# Patient Record
Sex: FEMALE | Race: WHITE | NOT HISPANIC OR LATINO | Employment: FULL TIME | URBAN - METROPOLITAN AREA
[De-identification: names, ages, dates, MRNs, and addresses within clinical notes are randomized per-mention and may not be internally consistent; named-entity substitution may affect disease eponyms.]

---

## 2017-03-08 DIAGNOSIS — J30.1 ALLERGIC RHINITIS DUE TO POLLEN: ICD-10-CM

## 2017-03-08 RX ORDER — FLUTICASONE PROPIONATE 50 MCG
1-2 SPRAY, SUSPENSION (ML) NASAL DAILY
Qty: 48 G | Refills: 1 | Status: SHIPPED | OUTPATIENT
Start: 2017-03-08 | End: 2017-11-13

## 2017-03-08 NOTE — TELEPHONE ENCOUNTER
Flonase      Last Written Prescription Date: 4/4/16  Last Fill Quantity: 48gm,  # refills: 2   Last Office Visit with G, [facility] or [facility] prescribing provider: 10/13/16      Dinorah Yin CPhT  [facility]    On behalf of [facility]

## 2017-03-08 NOTE — TELEPHONE ENCOUNTER
Prescription approved per [facility] Refill Protocol.    Negrita Lindquist, PharmD  [facility]  [phone]

## 2017-10-20 ENCOUNTER — ALLIED HEALTH/NURSE VISIT (OUTPATIENT)
Dept: FAMILY MEDICINE | Facility: CLINIC | Age: 42
End: 2017-10-20
Payer: COMMERCIAL

## 2017-10-20 DIAGNOSIS — Z23 NEED FOR PROPHYLACTIC VACCINATION AND INOCULATION AGAINST INFLUENZA: Primary | ICD-10-CM

## 2017-10-20 PROCEDURE — 90686 IIV4 VACC NO PRSV 0.5 ML IM: CPT

## 2017-10-20 PROCEDURE — 90471 IMMUNIZATION ADMIN: CPT

## 2017-10-20 PROCEDURE — 99207 ZZC NO CHARGE NURSE ONLY: CPT

## 2017-10-20 NOTE — MR AVS SNAPSHOT
"              After Visit Summary   10/20/2017    Hannah Carrillo    MRN: [id number]           Patient Information     Date Of Birth          1975        Visit Information        Provider Department      10/20/2017 1:00 PM [facility] [place]        Today's Diagnoses     Need for prophylactic vaccination and inoculation against influenza    -  1       Follow-ups after your visit        Who to contact     Normal or non-critical lab and imaging results will be communicated to you by MyChart, letter or phone within 4 business days after the clinic has received the results. If you do not hear from us within 7 days, please contact the clinic through MyChart or phone. If you have a critical or abnormal lab result, we will notify you by phone as soon as possible.  Submit refill requests through [employer] or call your pharmacy and they will forward the refill request to us. Please allow 3 business days for your refill to be completed.          If you need to speak with a  for additional information , please call: [phone]             Additional Information About Your Visit        [employer] Information     [employer] lets you send messages to your doctor, view your test results, renew your prescriptions, schedule appointments and more. To sign up, go to [email or web address] . Click on \"Log in\" on the left side of the screen, which will take you to the Welcome page. Then click on \"Sign up Now\" on the right side of the page.     You will be asked to enter the access code listed below, as well as some personal information. Please follow the directions to create your username and password.     Your access code is: [email or web address]  Expires: 2018  1:21 PM     Your access code will  in 90 days. If you need help or a new code, please call your [facility] or [phone].        Care EveryWhere ID     This is your Care EveryWhere ID. This could be used by other organizations to " access your [place] medical records  [id number]         Blood Pressure from Last 3 Encounters:   10/13/16 129/81   07/13/16 120/82   06/11/16 141/81    Weight from Last 3 Encounters:   10/13/16 171 lb 14.4 oz (78 kg)   07/13/16 177 lb 4.8 oz (80.4 kg)   05/17/16 176 lb 14.4 oz (80.2 kg)              We Performed the Following     FLU VAC, SPLIT VIRUS IM > 3 YO (QUADRIVALENT) [29379]     Vaccine Administration, Initial [08449]        Primary Care Provider Office Phone # Fax #    Julio Mcdaniel [phone]       [facility] [address] [facility] [address]        Equal Access to Services     ESCOBAR NERI : Hadii bora salomono Sochristopher, waaxda luqadaha, qaybta kaalmada adeegyada, derrick titus . So [facility] [phone].    ATENCIÓN: Si habla español, tiene a gaitan disposición servicios gratuitos de asistencia lingüística. Llame al [phone].    We comply with applicable federal civil rights laws and [place] laws. We do not discriminate on the basis of race, color, national origin, age, disability, sex, sexual orientation, or gender identity.            Thank you!     Thank you for choosing [facility]  for your care. Our goal is always to provide you with excellent care. Hearing back from our patients is one way we can continue to improve our services. Please take a few minutes to complete the written survey that you may receive in the mail after your visit with us. Thank you!             Your Updated Medication List - Protect others around you: Learn how to safely use, store and throw away your medicines at www.disposemymeds.org.          This list is accurate as of: 10/20/17  1:21 PM.  Always use your most recent med list.                   Brand Name Dispense Instructions for use Diagnosis    albuterol 108 (90 BASE) MCG/ACT Inhaler    PROAIR HFA/PROVENTIL HFA/VENTOLIN HFA    1 Inhaler    Inhale 2 puffs into the lungs every 6 hours as needed for  shortness of breath / dyspnea or wheezing    Cough       etonogestrel-ethinyl estradiol 0.12-0.015 MG/24HR vaginal ring    NUVARING     Place 1 each vaginally every 28 days    Epigastric pain       fluticasone 50 MCG/ACT spray    FLONASE    48 g    Spray 1-2 sprays into both nostrils daily    Allergic rhinitis due to pollen       HYDROcodone-acetaminophen 5-325 MG per tablet    NORCO    20 tablet    Take 1-2 tablets by mouth every 4 hours as needed for moderate to severe pain        Multi-vitamin Tabs tablet   Generic drug:  multivitamin, therapeutic with minerals      Take 1 tablet by mouth daily.        naproxen 500 MG tablet    NAPROSYN    30 tablet    Take 1 tablet (500 mg) by mouth 2 times daily as needed for moderate pain    Rib pain       OMEGA-3 FISH OIL PO           ZYRTEC 10 MG tablet   Generic drug:  cetirizine      1 TABLET DAILY

## 2017-11-13 DIAGNOSIS — J30.1 ALLERGIC RHINITIS DUE TO POLLEN: Primary | ICD-10-CM

## 2017-11-13 NOTE — TELEPHONE ENCOUNTER
Flonase      Last Written Prescription Date: 03/08/2017  Last Fill Quantity: 48,  # refills: 1   Last Office Visit with [facility], [facility] or [facility] prescribing provider: 10/13/2016    See Osman  Pharmacy Technician, Ty  [facility]  Phone: [phone]  Fax: [phone]

## 2017-11-14 RX ORDER — FLUTICASONE PROPIONATE 50 MCG
1-2 SPRAY, SUSPENSION (ML) NASAL DAILY
Qty: 16 G | Refills: 0 | Status: SHIPPED | OUTPATIENT
Start: 2017-11-14 | End: 2017-12-28

## 2017-11-14 NOTE — TELEPHONE ENCOUNTER
Medication is being filled for 1 time refill only due to:  Patient needs to be seen because it has been more than one year since last visit.   Wilmer Aguilar RN

## 2017-12-28 DIAGNOSIS — J30.1 ALLERGIC RHINITIS DUE TO POLLEN: ICD-10-CM

## 2018-01-03 RX ORDER — FLUTICASONE PROPIONATE 50 MCG
1 SPRAY, SUSPENSION (ML) NASAL DAILY
Qty: 16 G | Refills: 0 | Status: SHIPPED | OUTPATIENT
Start: 2018-01-03

## 2018-01-03 NOTE — TELEPHONE ENCOUNTER
Prescription approved per [facility] Refill Protocol. Patient has appointment on 1/15/18.  Wilmer Aguilar RN

## 2018-03-12 ENCOUNTER — OFFICE VISIT (OUTPATIENT)
Dept: OTOLARYNGOLOGY | Facility: CLINIC | Age: 43
End: 2018-03-12
Payer: COMMERCIAL

## 2018-03-12 VITALS
SYSTOLIC BLOOD PRESSURE: 126 MMHG | WEIGHT: 175 LBS | BODY MASS INDEX: 28.03 KG/M2 | DIASTOLIC BLOOD PRESSURE: 80 MMHG | TEMPERATURE: 97.7 F

## 2018-03-12 DIAGNOSIS — J32.9 CHRONIC SINUSITIS, UNSPECIFIED LOCATION: Primary | ICD-10-CM

## 2018-03-12 PROCEDURE — 99203 OFFICE O/P NEW LOW 30 MIN: CPT | Performed by: OTOLARYNGOLOGY

## 2018-03-12 RX ORDER — FLUTICASONE PROPIONATE 50 MCG
1-2 SPRAY, SUSPENSION (ML) NASAL DAILY
Qty: 16 G | Refills: 1 | Status: SHIPPED | OUTPATIENT
Start: 2018-03-12 | End: 2018-05-29

## 2018-03-12 ASSESSMENT — PAIN SCALES - GENERAL: PAINLEVEL: NO PAIN (0)

## 2018-03-12 NOTE — LETTER
3/12/2018         RE: Hannah Carrillo  [id number] [facility] [id number]        Dear Colleague,    Thank you for referring your patient, Hannah Carrillo, to the [facility]. Please see a copy of my visit note below.        History of Present Illness - Hannah Carrillo is a 43 year old female who presents with concerns about chronic sinusitis.  She describes bilateral frontal and maxillary sinus pressure and discomfort.  She has a history of a nasal surgery about 20 years ago performed by a plastic surgeon.  She denies any specific difficulty breathing through her nose but doesfeel congested.  She denies any thick rhinorrhea.  She has tried nasal steroids  and antihistamines in the past year without benefit.    Past Medical History -   Patient Active Problem List   Diagnosis     CARDIOVASCULAR SCREENING; LDL GOAL LESS THAN 160     Health Care Home       Current Medications -   Current Outpatient Prescriptions:      fluticasone (FLONASE) 50 MCG/ACT spray, Spray 1-2 sprays into both nostrils daily, Disp: 16 g, Rfl: 1     fluticasone (FLONASE) 50 MCG/ACT spray, Spray 1 spray into both nostrils daily, Disp: 16 g, Rfl: 0     Omega-3 Fatty Acids (OMEGA-3 FISH OIL PO), , Disp: , Rfl:      etonogestrel-ethinyl estradiol (NUVARING) 0.12-0.015 MG/24HR vaginal ring, Place 1 each vaginally every 28 days, Disp: , Rfl:      Multiple Vitamin (MULTI-VITAMIN) per tablet, Take 1 tablet by mouth daily., Disp: , Rfl:      ZYRTEC 10 MG OR TABS, 1 TABLET DAILY, Disp: , Rfl:     Allergies -   Allergies   Allergen Reactions     Penicillins      When was a baby     Sulfa Drugs      rash       Social History -   Social History     Social History     Marital status:      Spouse name: Darren Lujan     Number of children: 1     Years of education: 16     Occupational History      [employer]     Social History Main Topics     Smoking status: Never Smoker     Smokeless tobacco: Never Used      Alcohol use 0.0 oz/week     0 Standard drinks or equivalent per week      Comment: 3 drinks per week     Drug use: No     Sexual activity: Yes     Partners: Male     Birth control/ protection: Inserts, Inserts/Ring     Other Topics Concern     Parent/Sibling W/ Cabg, Mi Or Angioplasty Before 65f 55m? No     Social History Narrative       Family History -   Family History   Problem Relation Age of Onset     DIABETES Father      CEREBROVASCULAR DISEASE Father      Parkinsonism Mother        Review of Systems - As per HPI and PMHx, otherwise 7 system review of the head and neck negative. 10+ system review negative.    Physical Exam  /80 (BP Location: Right arm, Patient Position: Chair, Cuff Size: Adult Regular)  Temp 97.7  F (36.5  C) (Oral)  Wt 79.4 kg (175 lb)  BMI 28.03 kg/m2  General - The patient is well nourished and well developed, and appears to have good nutritional status.  Alert and oriented to person and place, answers questions and cooperates with examination appropriately.   Head and Face - Normocephalic and atraumatic, with no gross asymmetry noted of the contour of the facial features.  The facial nerve is intact, with strong symmetric movements.  Voice and Breathing - The patient was breathing comfortably without the use of accessory muscles. There was no wheezing, stridor, or stertor.  The patients voice was clear and strong, and had appropriate pitch and quality.  Ears - Bilateral pinna and EACs with normal appearing overlying skin. Tympanic membrane intact with good mobility on pneumatic otoscopy bilaterally. Bony landmarks of the ossicular chain are normal. The tympanic membranes are normal in appearance. No retraction, perforation, or masses.  No fluid or purulence was seen in the external canal or the middle ear.   Eyes - Extraocular movements intact.  Sclera were not icteric or injected, conjunctiva were pink and moist.  Mouth - Examination of the oral cavity showed pink, healthy  oral mucosa. No lesions or ulcerations noted.  The tongue was mobile and midline, and the dentition were in good condition.    Throat - The walls of the oropharynx were smooth, pink, moist, symmetric, and had no lesions or ulcerations.  The tonsillar pillars and soft palate were symmetric.  The uvula was midline on elevation.  Neck - Normal midline excursion of the laryngotracheal complex during swallowing.  Full range of motion on passive movement.  Palpation of the occipital, submental, submandibular, internal jugular chain, and supraclavicular nodes did not demonstrate any abnormal lymph nodes or masses.  The carotid pulse was palpable bilaterally.  Palpation of the thyroid was soft and smooth, with no nodules or goiter appreciated.  The trachea was mobile and midline.  Nose - External contour is symmetric, no gross deflection or scars.  Nasal mucosa is pink and moist with no abnormal mucus.  The septum was midline and non-obstructive, turbinates of normal size and position.  No polyps, masses, or purulence noted on examination.          Assessment - Hannah Carrillo is a 43 year old female with possible chronic sinusitis.  She is already tried Flonase without resolution.  I have requested a CT scan of her sinuses to evaluate for possible chronic sinus disease.  I will ask her to return should there be any positive findings on the CT scan.  Otherwise, I advised her to consider evaluation for potential headache disorders or other causes of facial pain.    Dr. Rosa M Silva MD  Otolaryngology  [facility]        Again, thank you for allowing me to participate in the care of your patient.        Sincerely,        Rosa M Silva MD

## 2018-03-12 NOTE — MR AVS SNAPSHOT
After Visit Summary   3/12/2018    Hannah Carrillo    MRN: [id number]           Patient Information     Date Of Birth          1975        Visit Information        Provider Department      3/12/2018 1:30 PM Rosa M Silva MD [facility]        Today's Diagnoses     Chronic sinusitis, unspecified location    -  1      Care Instructions    Per physician's instructions            Follow-ups after your visit        Your next 10 appointments already scheduled     Mar 24, 2018 10:00 AM CDT   CT MAXILLOFACIAL W/O CONTRAST with WYCT1   [facility] ([facility])    [address] [address]   [phone]           Please bring any scans or X-rays taken at other hospitals, if similar tests were done. Also bring a list of your medicines, including vitamins, minerals and over-the-counter drugs. It is safest to leave personal items at home.  Be sure to tell your doctor:   If you have any allergies.   If there s any chance you are pregnant.   If you are breastfeeding.  You do not need to do anything special to prepare for this exam.  Please wear loose clothing, such as a sweat suit or jogging clothes. Avoid snaps, zippers and other metal. We may ask you to undress and put on a hospital gown.              Future tests that were ordered for you today     Open Future Orders        Priority Expected Expires Ordered    CT Maxillofacial w/o Contrast Routine  3/12/2019 3/12/2018            Who to contact     If you have questions or need follow up information about today's clinic visit or your schedule please contact [facility] directly at [phone].  Normal or non-critical lab and imaging results will be communicated to you by MyChart, letter or phone within 4 business days after the clinic has received the results. If you do not hear from us within 7 days, please contact the clinic through MyChart or phone. If you have a critical or  "abnormal lab result, we will notify you by phone as soon as possible.  Submit refill requests through [employer] or call your pharmacy and they will forward the refill request to us. Please allow 3 business days for your refill to be completed.          Additional Information About Your Visit        [employer] Information     [employer] lets you send messages to your doctor, view your test results, renew your prescriptions, schedule appointments and more. To sign up, go to [email or web address] . Click on \"Log in\" on the left side of the screen, which will take you to the Welcome page. Then click on \"Sign up Now\" on the right side of the page.     You will be asked to enter the access code listed below, as well as some personal information. Please follow the directions to create your username and password.     Your access code is: [email or web address]  Expires: 6/10/2018  7:08 PM     Your access code will  in 90 days. If you need help or a new code, please call your [place] clinic or [phone].        Care EveryWhere ID     This is your Care EveryWhere ID. This could be used by other organizations to access your [place] medical records  [id number]        Your Vitals Were     Temperature BMI (Body Mass Index)                97.7  F (36.5  C) (Oral) 28.03 kg/m2           Blood Pressure from Last 3 Encounters:   18 126/80   10/13/16 129/81   16 120/82    Weight from Last 3 Encounters:   18 79.4 kg (175 lb)   10/13/16 78 kg (171 lb 14.4 oz)   16 80.4 kg (177 lb 4.8 oz)                 Today's Medication Changes          These changes are accurate as of 3/12/18  7:08 PM.  If you have any questions, ask your nurse or doctor.               These medicines have changed or have updated prescriptions.        Dose/Directions    * fluticasone 50 MCG/ACT spray   Commonly known as:  FLONASE   This may have changed:  Another medication with the same name was added. Make sure you understand how and when to " take each.   Used for:  Allergic rhinitis due to pollen   Changed by:  Rosa M Silva MD        Dose:  1 spray   Spray 1 spray into both nostrils daily   Quantity:  16 g   Refills:  0       * fluticasone 50 MCG/ACT spray   Commonly known as:  FLONASE   This may have changed:  You were already taking a medication with the same name, and this prescription was added. Make sure you understand how and when to take each.   Used for:  Chronic sinusitis, unspecified location   Changed by:  Rosa M Silva MD        Dose:  1-2 spray   Spray 1-2 sprays into both nostrils daily   Quantity:  16 g   Refills:  1       * Notice:  This list has 2 medication(s) that are the same as other medications prescribed for you. Read the directions carefully, and ask your doctor or other care provider to review them with you.         Where to get your medicines      These medications were sent to [facility] [address] [facility]  [address] [facility] [address]     Phone:  [phone]     fluticasone 50 MCG/ACT spray                Primary Care Provider Office Phone # Fax #    Julio Mcdaniel [phone]       [facility] [address] [facility] [address]        Equal Access to Services     ESCOBAR NERI AH: Hadii bora pickard hadasho Soomaali, waaxda luqadaha, qaybta kaalmada adeegyada, derrick berman. So [facility] [phone].    ATENCIÓN: Si habla español, tiene a gaitan disposición servicios gratuitos de asistencia lingüística. Llame al [phone].    We comply with applicable federal civil rights laws and [place] laws. We do not discriminate on the basis of race, color, national origin, age, disability, sex, sexual orientation, or gender identity.            Thank you!     Thank you for choosing [facility]  for your care. Our goal is always to provide you with excellent care. Hearing back from our patients is one way we can continue to improve  our services. Please take a few minutes to complete the written survey that you may receive in the mail after your visit with us. Thank you!             Your Updated Medication List - Protect others around you: Learn how to safely use, store and throw away your medicines at www.disposemymeds.org.          This list is accurate as of 3/12/18  7:08 PM.  Always use your most recent med list.                   Brand Name Dispense Instructions for use Diagnosis    etonogestrel-ethinyl estradiol 0.12-0.015 MG/24HR vaginal ring    NUVARING     Place 1 each vaginally every 28 days    Epigastric pain       * fluticasone 50 MCG/ACT spray    FLONASE    16 g    Spray 1 spray into both nostrils daily    Allergic rhinitis due to pollen       * fluticasone 50 MCG/ACT spray    FLONASE    16 g    Spray 1-2 sprays into both nostrils daily    Chronic sinusitis, unspecified location       Multi-vitamin Tabs tablet   Generic drug:  multivitamin, therapeutic with minerals      Take 1 tablet by mouth daily.        OMEGA-3 FISH OIL PO           ZYRTEC 10 MG tablet   Generic drug:  cetirizine      1 TABLET DAILY        * Notice:  This list has 2 medication(s) that are the same as other medications prescribed for you. Read the directions carefully, and ask your doctor or other care provider to review them with you.

## 2018-03-12 NOTE — PROGRESS NOTES
History of Present Illness - Hannah Carrillo is a 43 year old female who presents with concerns about chronic sinusitis.  She describes bilateral frontal and maxillary sinus pressure and discomfort.  She has a history of a nasal surgery about 20 years ago performed by a plastic surgeon.  She denies any specific difficulty breathing through her nose but doesfeel congested.  She denies any thick rhinorrhea.  She has tried nasal steroids  and antihistamines in the past year without benefit.    Past Medical History -   Patient Active Problem List   Diagnosis     CARDIOVASCULAR SCREENING; LDL GOAL LESS THAN 160     Health Care Home       Current Medications -   Current Outpatient Prescriptions:      fluticasone (FLONASE) 50 MCG/ACT spray, Spray 1-2 sprays into both nostrils daily, Disp: 16 g, Rfl: 1     fluticasone (FLONASE) 50 MCG/ACT spray, Spray 1 spray into both nostrils daily, Disp: 16 g, Rfl: 0     Omega-3 Fatty Acids (OMEGA-3 FISH OIL PO), , Disp: , Rfl:      etonogestrel-ethinyl estradiol (NUVARING) 0.12-0.015 MG/24HR vaginal ring, Place 1 each vaginally every 28 days, Disp: , Rfl:      Multiple Vitamin (MULTI-VITAMIN) per tablet, Take 1 tablet by mouth daily., Disp: , Rfl:      ZYRTEC 10 MG OR TABS, 1 TABLET DAILY, Disp: , Rfl:     Allergies -   Allergies   Allergen Reactions     Penicillins      When was a baby     Sulfa Drugs      rash       Social History -   Social History     Social History     Marital status:      Spouse name: Darren Lujan     Number of children: 1     Years of education: 16     Occupational History      [employer]     Social History Main Topics     Smoking status: Never Smoker     Smokeless tobacco: Never Used     Alcohol use 0.0 oz/week     0 Standard drinks or equivalent per week      Comment: 3 drinks per week     Drug use: No     Sexual activity: Yes     Partners: Male     Birth control/ protection: Inserts, Inserts/Ring     Other Topics Concern      Parent/Sibling W/ Cabg, Mi Or Angioplasty Before 65f 55m? No     Social History Narrative       Family History -   Family History   Problem Relation Age of Onset     DIABETES Father      CEREBROVASCULAR DISEASE Father      Parkinsonism Mother        Review of Systems - As per HPI and PMHx, otherwise 7 system review of the head and neck negative. 10+ system review negative.    Physical Exam  /80 (BP Location: Right arm, Patient Position: Chair, Cuff Size: Adult Regular)  Temp 97.7  F (36.5  C) (Oral)  Wt 79.4 kg (175 lb)  BMI 28.03 kg/m2  General - The patient is well nourished and well developed, and appears to have good nutritional status.  Alert and oriented to person and place, answers questions and cooperates with examination appropriately.   Head and Face - Normocephalic and atraumatic, with no gross asymmetry noted of the contour of the facial features.  The facial nerve is intact, with strong symmetric movements.  Voice and Breathing - The patient was breathing comfortably without the use of accessory muscles. There was no wheezing, stridor, or stertor.  The patients voice was clear and strong, and had appropriate pitch and quality.  Ears - Bilateral pinna and EACs with normal appearing overlying skin. Tympanic membrane intact with good mobility on pneumatic otoscopy bilaterally. Bony landmarks of the ossicular chain are normal. The tympanic membranes are normal in appearance. No retraction, perforation, or masses.  No fluid or purulence was seen in the external canal or the middle ear.   Eyes - Extraocular movements intact.  Sclera were not icteric or injected, conjunctiva were pink and moist.  Mouth - Examination of the oral cavity showed pink, healthy oral mucosa. No lesions or ulcerations noted.  The tongue was mobile and midline, and the dentition were in good condition.    Throat - The walls of the oropharynx were smooth, pink, moist, symmetric, and had no lesions or ulcerations.  The tonsillar  pillars and soft palate were symmetric.  The uvula was midline on elevation.  Neck - Normal midline excursion of the laryngotracheal complex during swallowing.  Full range of motion on passive movement.  Palpation of the occipital, submental, submandibular, internal jugular chain, and supraclavicular nodes did not demonstrate any abnormal lymph nodes or masses.  The carotid pulse was palpable bilaterally.  Palpation of the thyroid was soft and smooth, with no nodules or goiter appreciated.  The trachea was mobile and midline.  Nose - External contour is symmetric, no gross deflection or scars.  Nasal mucosa is pink and moist with no abnormal mucus.  The septum was midline and non-obstructive, turbinates of normal size and position.  No polyps, masses, or purulence noted on examination.          Assessment - Hannah Carrillo is a 43 year old female with possible chronic sinusitis.  She is already tried Flonase without resolution.  I have requested a CT scan of her sinuses to evaluate for possible chronic sinus disease.  I will ask her to return should there be any positive findings on the CT scan.  Otherwise, I advised her to consider evaluation for potential headache disorders or other causes of facial pain.    Dr. Rosa M Silva MD  Otolaryngology  [facility]

## 2018-03-12 NOTE — NURSING NOTE
"Initial /80 (BP Location: Right arm, Patient Position: Chair, Cuff Size: Adult Regular)  Temp 97.7  F (36.5  C) (Oral)  Wt 79.4 kg (175 lb)  BMI 28.03 kg/m2 Estimated body mass index is 28.03 kg/(m^2) as calculated from the following:    Height as of 10/13/16: 1.683 m (5' 6.25\").    Weight as of this encounter: 79.4 kg (175 lb). .    Tanya Armijo LPN    "

## 2018-03-24 ENCOUNTER — HOSPITAL ENCOUNTER (OUTPATIENT)
Dept: CT IMAGING | Facility: CLINIC | Age: 43
Discharge: HOME | End: 2018-03-24
Attending: OTOLARYNGOLOGY | Admitting: OTOLARYNGOLOGY
Payer: COMMERCIAL

## 2018-03-24 DIAGNOSIS — J32.9 CHRONIC SINUSITIS, UNSPECIFIED LOCATION: ICD-10-CM

## 2018-03-24 PROCEDURE — 70486 CT MAXILLOFACIAL W/O DYE: CPT

## 2018-03-28 ENCOUNTER — TELEPHONE (OUTPATIENT)
Dept: OTOLARYNGOLOGY | Facility: CLINIC | Age: 43
End: 2018-03-28

## 2018-03-28 NOTE — TELEPHONE ENCOUNTER
I have reviewed the CT Sinus. There is some evidence of her prior nasal surgery, resulting in symmetric fractures of her nasal bones. This is not expected to cause any ongoing pain at this point. Her paranasal sinuses are all pretty clear, especially those in her midface and forehead, the areas that corresponded to her headache symptoms. Recommend consideration of headache workup and treatment, possibly through Neurology.    Dr. Silva

## 2018-03-28 NOTE — TELEPHONE ENCOUNTER
Reason for Call:  Other returning call    Detailed comments: Pt calling to get CT results from     Phone Number Patient can be reached at: Home number on file [phone] (home)    Best Time: today    Can we leave a detailed message on this number? NO    Call taken on 3/28/2018 at 11:32 AM by Negrita Willett

## 2018-03-28 NOTE — TELEPHONE ENCOUNTER
Ct results pending Dr. Silva's review.    Laurence Huber  [facility] RN

## 2018-05-29 DIAGNOSIS — J32.9 CHRONIC SINUSITIS, UNSPECIFIED LOCATION: ICD-10-CM

## 2018-05-29 RX ORDER — FLUTICASONE PROPIONATE 50 MCG
1-2 SPRAY, SUSPENSION (ML) NASAL DAILY
Qty: 16 G | Refills: 0 | Status: SHIPPED | OUTPATIENT
Start: 2018-05-29 | End: 2018-07-11

## 2018-05-29 NOTE — TELEPHONE ENCOUNTER
Pending Prescriptions:                       Disp   Refills    fluticasone (FLONASE) 50 MCG/ACT spray    16 g   1            Sig: Spray 1-2 sprays into both nostrils daily

## 2018-05-29 NOTE — TELEPHONE ENCOUNTER
Pending Prescriptions:                       Disp   Refills    fluticasone (FLONASE) 50 MCG/ACT spray    16 g   1            Sig: Spray 1-2 sprays into both nostrils daily    LOV 3/2018  1 stephen refill sent    Higinio Haney RN....5/29/2018 1:36 PM

## 2018-06-20 ENCOUNTER — HOSPITAL ENCOUNTER (OUTPATIENT)
Dept: MAMMOGRAPHY | Facility: CLINIC | Age: 43
Discharge: HOME | End: 2018-06-20
Attending: OBSTETRICS & GYNECOLOGY

## 2018-06-20 DIAGNOSIS — Z12.31 VISIT FOR SCREENING MAMMOGRAM: ICD-10-CM

## 2018-07-01 ENCOUNTER — HEALTH MAINTENANCE LETTER (OUTPATIENT)
Age: 43
End: 2018-07-01

## 2018-07-11 DIAGNOSIS — J32.9 CHRONIC SINUSITIS, UNSPECIFIED LOCATION: ICD-10-CM

## 2018-07-11 RX ORDER — FLUTICASONE PROPIONATE 50 MCG
1-2 SPRAY, SUSPENSION (ML) NASAL DAILY
Qty: 16 G | Refills: 0 | Status: SHIPPED | OUTPATIENT
Start: 2018-07-11 | End: 2018-08-23

## 2018-07-11 NOTE — TELEPHONE ENCOUNTER
Pending Prescriptions:                       Disp   Refills    fluticasone (FLONASE) 50 MCG/ACT spray    16 g   0            Sig: Spray 1-2 sprays into both nostrils daily

## 2018-07-11 NOTE — TELEPHONE ENCOUNTER
Pending Prescriptions:                       Disp   Refills    fluticasone (FLONASE) 50 MCG/ACT spray    16 g   0            Sig: Spray 1-2 sprays into both nostrils daily    Nancy Pyle RN

## 2018-08-23 DIAGNOSIS — J32.9 CHRONIC SINUSITIS, UNSPECIFIED LOCATION: ICD-10-CM

## 2018-08-23 RX ORDER — FLUTICASONE PROPIONATE 50 MCG
1-2 SPRAY, SUSPENSION (ML) NASAL DAILY
Qty: 16 G | Refills: 0 | Status: SHIPPED | OUTPATIENT
Start: 2018-08-23 | End: 2018-10-08

## 2018-08-23 NOTE — TELEPHONE ENCOUNTER
Pending Prescriptions:                       Disp   Refills    fluticasone (FLONASE) 50 MCG/ACT spray    16 g   0            Sig: Spray 1-2 sprays into both nostrils daily      Last Written Prescription Date:  7/11/18  Last Fill Quantity: 16 g,   # refills: 0  Last Office Visit: 3/12/18  Future Office visit:  none    Routing refill request to provider for review/approval because:  All nasal steroids need to be routed to provider.    Higinio Haney RN....8/23/2018 9:57 AM

## 2018-10-08 DIAGNOSIS — J32.9 CHRONIC SINUSITIS, UNSPECIFIED LOCATION: ICD-10-CM

## 2018-10-08 RX ORDER — FLUTICASONE PROPIONATE 50 MCG
1-2 SPRAY, SUSPENSION (ML) NASAL DAILY
Qty: 16 G | Refills: 0 | Status: SHIPPED | OUTPATIENT
Start: 2018-10-08 | End: 2018-11-21

## 2018-11-21 DIAGNOSIS — J32.9 CHRONIC SINUSITIS, UNSPECIFIED LOCATION: ICD-10-CM

## 2018-11-21 RX ORDER — FLUTICASONE PROPIONATE 50 MCG
1-2 SPRAY, SUSPENSION (ML) NASAL DAILY
Qty: 16 G | Refills: 1 | Status: SHIPPED | OUTPATIENT
Start: 2018-11-21 | End: 2020-11-02

## 2018-11-21 NOTE — TELEPHONE ENCOUNTER
Dr Silva last seen in March and did CT.  Per results , she was advised to consider HA workup thru PCP/Neurology.  No need to return to ENT.    Spoke with patient and let her know that although med is OTC avail and we said no need to return, she would eventually need to follow up with ENT if wanted ongoing refills from him.    Pt stated understanding.  Was considering changing it to her allergist anyhow.  Pt provided with 2 months of med in order to make the change or consider seeing us for follow up.    Bel Sprague RN  [facility]

## 2018-11-21 NOTE — TELEPHONE ENCOUNTER
Pt calling to check status of refill. She said she requested the this from the pharmacy a week ago.

## 2019-10-18 ENCOUNTER — IMMUNIZATION (OUTPATIENT)
Dept: FAMILY MEDICINE | Facility: CLINIC | Age: 44
End: 2019-10-18
Payer: COMMERCIAL

## 2019-10-18 PROCEDURE — 90471 IMMUNIZATION ADMIN: CPT

## 2019-10-18 PROCEDURE — 90686 IIV4 VACC NO PRSV 0.5 ML IM: CPT

## 2020-10-15 ENCOUNTER — IMMUNIZATION (OUTPATIENT)
Dept: FAMILY MEDICINE | Facility: CLINIC | Age: 45
End: 2020-10-15
Payer: COMMERCIAL

## 2020-10-15 DIAGNOSIS — Z23 NEED FOR PROPHYLACTIC VACCINATION AND INOCULATION AGAINST INFLUENZA: Primary | ICD-10-CM

## 2020-10-15 PROCEDURE — 99207 PR NO CHARGE NURSE ONLY: CPT

## 2020-10-15 PROCEDURE — 90686 IIV4 VACC NO PRSV 0.5 ML IM: CPT

## 2020-10-15 PROCEDURE — 90471 IMMUNIZATION ADMIN: CPT

## 2020-11-02 ENCOUNTER — OFFICE VISIT (OUTPATIENT)
Dept: FAMILY MEDICINE | Facility: CLINIC | Age: 45
End: 2020-11-02
Payer: COMMERCIAL

## 2020-11-02 VITALS
WEIGHT: 175.7 LBS | TEMPERATURE: 99.4 F | HEART RATE: 101 BPM | SYSTOLIC BLOOD PRESSURE: 149 MMHG | HEIGHT: 66 IN | RESPIRATION RATE: 16 BRPM | DIASTOLIC BLOOD PRESSURE: 86 MMHG | BODY MASS INDEX: 28.24 KG/M2

## 2020-11-02 DIAGNOSIS — M79.5 FOREIGN BODY (FB) IN SOFT TISSUE: Primary | ICD-10-CM

## 2020-11-02 PROCEDURE — 10120 INC&RMVL FB SUBQ TISS SMPL: CPT | Performed by: FAMILY MEDICINE

## 2020-11-02 RX ORDER — DROSPIRENONE AND ETHINYL ESTRADIOL TABLETS 0.02-3(28)
KIT ORAL
COMMUNITY
Start: 2020-08-04

## 2020-11-02 ASSESSMENT — PAIN SCALES - GENERAL: PAINLEVEL: MODERATE PAIN (5)

## 2020-11-02 ASSESSMENT — MIFFLIN-ST. JEOR: SCORE: 1452.69

## 2020-11-02 NOTE — PROGRESS NOTES
"SUBJECTIVE:                                                    Hannah Perdomo is a 45 year old female who presents to clinic today for the following health issues:    Concern - something stuck in her right foot  Onset: a couple weeks ago.    Description:   She thought she felt herself step on something a couple weeks ago but then the pain seemed to go away. Just a few days ago the pain came back an it is in her heel. She says it feels like something is stuck in her foot.     Intensity: severe    Progression of Symptoms:  worsening    Accompanying Signs & Symptoms:  none    Previous history of similar problem:   none    Precipitating factors:   Worsened by: Being on her feet    Alleviating factors:  Improved by: Heat does seem to help.    Therapies Tried and outcome: he has applied heat and that has helped relax her foot.    Problem list and histories reviewed & adjusted, as indicated.  Additional history:         ROS:  Constitutional, HEENT, cardiovascular, pulmonary, gi and gu systems are negative, except as otherwise noted.    OBJECTIVE:                                                    BP (!) 149/86   Pulse 101   Temp 99.4  F (37.4  C) (Tympanic)   Resp 16   Ht 1.683 m (5' 6.25\")   Wt 79.7 kg (175 lb 11.2 oz)   BMI 28.15 kg/m   Body mass index is 28.15 kg/m .   GENERAL: healthy, alert, well nourished, well hydrated, no distress  HENT: ear canals- normal; TMs- normal; Nose- normal; Mouth- no ulcers, no lesions  NECK: no tenderness, no adenopathy, no asymmetry, no masses, no stiffness; thyroid- normal to palpation  RESP: lungs clear to auscultation - no rales, no rhonchi, no wheezes  CV: regular rates and rhythm, normal S1 S2, no S3 or S4 and no murmur, no click or rub -  ABDOMEN: soft, no tenderness, no  hepatosplenomegaly, no masses, normal bowel sounds  Foot: superficial granulation tissue present.it does feel like a firm linear object is imbedded.     ASSESSMENT/PLAN:                                    "                   (M79.5) Foreign body (FB) in soft tissue  (primary encounter diagnosis)      Wound  cares given.       After discussing Risks, Benefits and alternative treatments, answering any questons to .name satisfaction. patient requested xploreprocedure peformed and signed consent.     Area was sterily pred. Using sterile technique shave of callous then explration small piece of apprant glas was remoced 1mmX 3mm ewas removed withot any complication.  Anesthesia was occomplished with 1% lidocaine.  Pt tolerated procedure well.    0.3cc's estimated blood loss.       reports that she has never smoked. She has never used smokeless tobacco.    [facility]

## 2021-01-15 ENCOUNTER — HEALTH MAINTENANCE LETTER (OUTPATIENT)
Age: 46
End: 2021-01-15

## 2021-02-07 ENCOUNTER — HEALTH MAINTENANCE LETTER (OUTPATIENT)
Age: 46
End: 2021-02-07

## 2021-03-19 ENCOUNTER — OFFICE VISIT (OUTPATIENT)
Dept: FAMILY MEDICINE | Facility: CLINIC | Age: 46
End: 2021-03-19
Payer: COMMERCIAL

## 2021-03-19 VITALS
DIASTOLIC BLOOD PRESSURE: 70 MMHG | WEIGHT: 172 LBS | BODY MASS INDEX: 27.64 KG/M2 | HEIGHT: 66 IN | HEART RATE: 72 BPM | TEMPERATURE: 99.3 F | SYSTOLIC BLOOD PRESSURE: 124 MMHG

## 2021-03-19 DIAGNOSIS — M54.2 NECK PAIN: ICD-10-CM

## 2021-03-19 DIAGNOSIS — S13.4XXA WHIPLASH INJURY TO NECK, INITIAL ENCOUNTER: Primary | ICD-10-CM

## 2021-03-19 PROCEDURE — 99213 OFFICE O/P EST LOW 20 MIN: CPT | Performed by: PHYSICIAN ASSISTANT

## 2021-03-19 RX ORDER — CYCLOBENZAPRINE HCL 10 MG
5-10 TABLET ORAL 3 TIMES DAILY PRN
Qty: 10 TABLET | Refills: 0 | Status: SHIPPED | OUTPATIENT
Start: 2021-03-19 | End: 2021-09-20

## 2021-03-19 ASSESSMENT — MIFFLIN-ST. JEOR: SCORE: 1430.91

## 2021-03-19 ASSESSMENT — PAIN SCALES - GENERAL: PAINLEVEL: EXTREME PAIN (8)

## 2021-03-19 NOTE — PATIENT INSTRUCTIONS
Continue the ibuprofen 600mg three times daily with meals for the next 4-5 days assuming your stomach can handle that    Can insert or alternate with tylenol 650mg to help control pain/discomfort      Continue to alternate heat w/ice and try to find positions where the head/neck are supported (laying down, sitting in a recliner with the head supported)      AT bedtime, if there is still a lot of tension or pain, can try the muscle relaxer - can start with 1/2 dose
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
## 2021-04-07 ENCOUNTER — ANCILLARY PROCEDURE (OUTPATIENT)
Dept: GENERAL RADIOLOGY | Facility: CLINIC | Age: 46
End: 2021-04-07
Attending: FAMILY MEDICINE
Payer: COMMERCIAL

## 2021-04-07 ENCOUNTER — OFFICE VISIT (OUTPATIENT)
Dept: FAMILY MEDICINE | Facility: CLINIC | Age: 46
End: 2021-04-07
Payer: COMMERCIAL

## 2021-04-07 VITALS
BODY MASS INDEX: 27.63 KG/M2 | SYSTOLIC BLOOD PRESSURE: 139 MMHG | TEMPERATURE: 98.6 F | WEIGHT: 172.5 LBS | DIASTOLIC BLOOD PRESSURE: 82 MMHG | HEART RATE: 92 BPM

## 2021-04-07 DIAGNOSIS — M54.2 NECK PAIN: Primary | ICD-10-CM

## 2021-04-07 DIAGNOSIS — M54.2 NECK PAIN: ICD-10-CM

## 2021-04-07 PROCEDURE — 72040 X-RAY EXAM NECK SPINE 2-3 VW: CPT | Mod: FY | Performed by: RADIOLOGY

## 2021-04-07 PROCEDURE — 99214 OFFICE O/P EST MOD 30 MIN: CPT | Performed by: FAMILY MEDICINE

## 2021-04-07 RX ORDER — METAXALONE 800 MG/1
800 TABLET ORAL 3 TIMES DAILY PRN
Qty: 30 TABLET | Refills: 0 | Status: SHIPPED | OUTPATIENT
Start: 2021-04-07 | End: 2021-09-20

## 2021-04-07 ASSESSMENT — PAIN SCALES - GENERAL: PAINLEVEL: EXTREME PAIN (9)
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
## 2021-05-28 ENCOUNTER — RECORDS - HEALTHEAST (OUTPATIENT)
Dept: ADMINISTRATIVE | Facility: CLINIC | Age: 46
End: 2021-05-28

## 2021-05-29 ENCOUNTER — RECORDS - HEALTHEAST (OUTPATIENT)
Dept: ADMINISTRATIVE | Facility: CLINIC | Age: 46
End: 2021-05-29

## 2021-05-31 ENCOUNTER — RECORDS - HEALTHEAST (OUTPATIENT)
Dept: ADMINISTRATIVE | Facility: CLINIC | Age: 46
End: 2021-05-31

## 2021-08-26 ENCOUNTER — ANCILLARY PROCEDURE (OUTPATIENT)
Dept: GENERAL RADIOLOGY | Facility: CLINIC | Age: 46
End: 2021-08-26
Attending: FAMILY MEDICINE
Payer: COMMERCIAL

## 2021-08-26 ENCOUNTER — OFFICE VISIT (OUTPATIENT)
Dept: FAMILY MEDICINE | Facility: CLINIC | Age: 46
End: 2021-08-26
Payer: COMMERCIAL

## 2021-08-26 VITALS
TEMPERATURE: 98.8 F | DIASTOLIC BLOOD PRESSURE: 79 MMHG | HEIGHT: 66 IN | HEART RATE: 81 BPM | RESPIRATION RATE: 16 BRPM | BODY MASS INDEX: 27.8 KG/M2 | SYSTOLIC BLOOD PRESSURE: 122 MMHG | WEIGHT: 173 LBS

## 2021-08-26 DIAGNOSIS — M25.551 HIP PAIN, RIGHT: ICD-10-CM

## 2021-08-26 DIAGNOSIS — M25.551 HIP PAIN, RIGHT: Primary | ICD-10-CM

## 2021-08-26 PROCEDURE — 73502 X-RAY EXAM HIP UNI 2-3 VIEWS: CPT | Mod: RT | Performed by: RADIOLOGY

## 2021-08-26 PROCEDURE — 99214 OFFICE O/P EST MOD 30 MIN: CPT | Performed by: FAMILY MEDICINE

## 2021-08-26 PROCEDURE — 72100 X-RAY EXAM L-S SPINE 2/3 VWS: CPT | Mod: FY | Performed by: RADIOLOGY

## 2021-08-26 ASSESSMENT — MIFFLIN-ST. JEOR: SCORE: 1435.44

## 2021-08-26 ASSESSMENT — PAIN SCALES - GENERAL: PAINLEVEL: MODERATE PAIN (5)
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
## 2021-09-03 ENCOUNTER — HOSPITAL ENCOUNTER (OUTPATIENT)
Dept: MRI IMAGING | Facility: CLINIC | Age: 46
Discharge: HOME | End: 2021-09-03
Attending: FAMILY MEDICINE | Admitting: FAMILY MEDICINE
Payer: COMMERCIAL

## 2021-09-03 DIAGNOSIS — M25.551 HIP PAIN, RIGHT: ICD-10-CM

## 2021-09-03 PROCEDURE — 73721 MRI JNT OF LWR EXTRE W/O DYE: CPT | Mod: 26 | Performed by: RADIOLOGY

## 2021-09-03 PROCEDURE — 73721 MRI JNT OF LWR EXTRE W/O DYE: CPT | Mod: RT

## 2021-09-18 ENCOUNTER — HEALTH MAINTENANCE LETTER (OUTPATIENT)
Age: 46
End: 2021-09-18

## 2021-09-20 ENCOUNTER — OFFICE VISIT (OUTPATIENT)
Dept: FAMILY MEDICINE | Facility: CLINIC | Age: 46
End: 2021-09-20
Payer: COMMERCIAL

## 2021-09-20 VITALS
SYSTOLIC BLOOD PRESSURE: 137 MMHG | HEART RATE: 89 BPM | HEIGHT: 66 IN | DIASTOLIC BLOOD PRESSURE: 83 MMHG | BODY MASS INDEX: 27.9 KG/M2 | RESPIRATION RATE: 16 BRPM | TEMPERATURE: 99.2 F | WEIGHT: 173.6 LBS

## 2021-09-20 DIAGNOSIS — M25.551 HIP PAIN, RIGHT: Primary | ICD-10-CM

## 2021-09-20 PROCEDURE — 99213 OFFICE O/P EST LOW 20 MIN: CPT | Mod: 25 | Performed by: FAMILY MEDICINE

## 2021-09-20 PROCEDURE — 20610 DRAIN/INJ JOINT/BURSA W/O US: CPT | Performed by: FAMILY MEDICINE

## 2021-09-20 RX ADMIN — TRIAMCINOLONE ACETONIDE 40 MG: 40 INJECTION, SUSPENSION INTRA-ARTICULAR; INTRAMUSCULAR at 16:50

## 2021-09-20 ASSESSMENT — MIFFLIN-ST. JEOR: SCORE: 1438.16

## 2021-09-20 ASSESSMENT — PAIN SCALES - GENERAL: PAINLEVEL: MODERATE PAIN (5)

## 2021-09-20 NOTE — PROGRESS NOTES
"SUBJECTIVE:                                                    Hannah Perdomo is a 46 year old female who presents to clinic today for the following health issues:    Chief Complaint   Patient presents with     Results     -She is here today to go over her results from her MRI on 09/03/2021.    -She is wanting to discuss next steps.    Problem list and histories reviewed & adjusted, as indicated.  Additional history:     Patient Active Problem List   Diagnosis     CARDIOVASCULAR SCREENING; LDL GOAL LESS THAN 160     Health Care Home     Past Surgical History:   Procedure Laterality Date     C APPENDECTOMY       HC RECONSTR NOSE+ASPEN SEPTAL REPAIR         Social History     Tobacco Use     Smoking status: Never Smoker     Smokeless tobacco: Never Used   Substance Use Topics     Alcohol use: Yes     Alcohol/week: 0.0 standard drinks     Comment: 3 drinks per week     Family History   Problem Relation Age of Onset     Diabetes Father      Cerebrovascular Disease Father      Parkinsonism Mother          Current Outpatient Medications   Medication Sig Dispense Refill     fluticasone (FLONASE) 50 MCG/ACT spray Spray 1 spray into both nostrils daily 16 g 0     LORYNA 3-0.02 MG tablet        Multiple Vitamin (MULTI-VITAMIN) per tablet Take 1 tablet by mouth daily.       Omega-3 Fatty Acids (OMEGA-3 FISH OIL PO)        ZYRTEC 10 MG OR TABS 1 TABLET DAILY       Allergies   Allergen Reactions     Pcn [Penicillins]      When was a baby     Sulfa Drugs      rash     ROS:  Constitutional, HEENT, cardiovascular, pulmonary, gi and gu systems are negative, except as otherwise noted.    OBJECTIVE:                                                    /83   Pulse 89   Temp 99.2  F (37.3  C) (Tympanic)   Resp 16   Ht 1.683 m (5' 6.25\")   Wt 78.7 kg (173 lb 9.6 oz)   BMI 27.81 kg/m   Body mass index is 27.81 kg/m .   GENERAL: healthy, alert, well nourished, well hydrated, no distress  HENT: ear canals- normal; TMs- normal; " Nose- normal; Mouth- no ulcers, no lesions  NECK: no tenderness, no adenopathy, no asymmetry, no masses, no stiffness; thyroid- normal to palpation  RESP: lungs clear to auscultation - no rales, no rhonchi, no wheezes  CV: regular rates and rhythm, normal S1 S2, no S3 or S4 and no murmur, no click or rub -  ABDOMEN: soft, no tenderness, no  hepatosplenomegaly, no masses, normal bowel sounds  HIP: Full range of motion with internal and external rotation, axial load does  not reproduce symptoms straight leg raise does not reproduce symptom,  there is  tenderness over the greater trochanteric bursa       ASSESSMENT/PLAN:                                                      (M25.551) Hip pain, right  (primary encounter diagnosis)  Comment: The risks, benefits and potential complications  of injection were discussed with the patient. Questions were addressed and answered. The patient elected to proceed. Written informed consent was obtained. The correct procedural site was identified and confirmed. A 40 intraarticular injection was performed using 40mg kenalog and 2 CC 1% lidocaine  after sterile prep, to the correct procedural site. Sterile bandaid applied. This was tolerated well by the patient. No apparent complications. Patient had immediate improvement in symptoms.    Plan: [facility] PT and Hand Referral, TRIAMCINOLONE ACET         INJ NOS, DRAIN/INJECT LARGE JOINT/BURSA           reports that she has never smoked. She has never used smokeless tobacco.      Weight management plan: Discussed healthy diet and exercise guidelines      [facility]

## 2021-09-20 NOTE — PROGRESS NOTES
Clinic Administered Medication Documentation    Administrations This Visit     triamcinolone (KENALOG-40) injection 40 mg     Admin Date  09/20/2021 Action  Given Dose  40 mg Route  INTRA-ARTICULAR Site  Right Hip Administered By  Amparo Rolon    Ordering Provider: Julio Mcdaniel MD    NDC: [address]-2    Lot#: [id number]    : [facility]    Patient Supplied?: No              Injection Documentation     Injection was administered by provider (please see MAR for given by information). Please see MAR and medication order for additional information.     Site: Bursa injection   Medication Used: Kenelog 40    Expiration Date:  01/01/2023    Amparo Rolon CMA

## 2021-10-03 RX ORDER — TRIAMCINOLONE ACETONIDE 40 MG/ML
40 INJECTION, SUSPENSION INTRA-ARTICULAR; INTRAMUSCULAR ONCE
Status: COMPLETED | OUTPATIENT
Start: 2021-10-03 | End: 2021-09-20

## 2021-10-18 ENCOUNTER — THERAPY VISIT (OUTPATIENT)
Dept: PHYSICAL THERAPY | Facility: CLINIC | Age: 46
End: 2021-10-18
Payer: COMMERCIAL

## 2021-10-18 DIAGNOSIS — M25.551 HIP PAIN, RIGHT: ICD-10-CM

## 2021-10-18 PROCEDURE — 97110 THERAPEUTIC EXERCISES: CPT | Mod: GP | Performed by: PHYSICAL THERAPIST

## 2021-10-18 PROCEDURE — 97140 MANUAL THERAPY 1/> REGIONS: CPT | Mod: GP | Performed by: PHYSICAL THERAPIST

## 2021-10-18 PROCEDURE — 97162 PT EVAL MOD COMPLEX 30 MIN: CPT | Mod: GP | Performed by: PHYSICAL THERAPIST

## 2021-10-18 ASSESSMENT — ACTIVITIES OF DAILY LIVING (ADL)
WALKING_INITIALLY: SLIGHT DIFFICULTY
WALKING_15_MINUTES_OR_GREATER: SLIGHT DIFFICULTY
HOW_WOULD_YOU_RATE_YOUR_CURRENT_LEVEL_OF_FUNCTION_DURING_YOUR_USUAL_ACTIVITIES_OF_DAILY_LIVING_FROM_0_TO_100_WITH_100_BEING_YOUR_LEVEL_OF_FUNCTION_PRIOR_TO_YOUR_HIP_PROBLEM_AND_0_BEING_THE_INABILITY_TO_PERFORM_ANY_OF_YOUR_USUAL_DAILY_ACTIVITIES?: 60
TWISTING/PIVOTING_ON_INVOLVED_LEG: SLIGHT DIFFICULTY
HOS_ADL_SCORE(%): 76.56
HOS_ADL_ITEM_SCORE_TOTAL: 49
GOING_DOWN_1_FLIGHT_OF_STAIRS: SLIGHT DIFFICULTY
HOS_ADL_COUNT: 16
PUTTING_ON_SOCKS_AND_SHOES: NO DIFFICULTY AT ALL
RECREATIONAL_ACTIVITIES: MODERATE DIFFICULTY
STEPPING_UP_AND_DOWN_CURBS: NO DIFFICULTY AT ALL
DEEP_SQUATTING: SLIGHT DIFFICULTY
HOS_ADL_HIGHEST_POTENTIAL_SCORE: 64
GETTING_INTO_AND_OUT_OF_A_BATHTUB: SLIGHT DIFFICULTY
WALKING_APPROXIMATELY_10_MINUTES: SLIGHT DIFFICULTY
ROLLING_OVER_IN_BED: SLIGHT DIFFICULTY
HEAVY_WORK: MODERATE DIFFICULTY
STANDING_FOR_15_MINUTES: NO DIFFICULTY AT ALL
GOING_UP_1_FLIGHT_OF_STAIRS: SLIGHT DIFFICULTY
GETTING_INTO_AND_OUT_OF_AN_AVERAGE_CAR: SLIGHT DIFFICULTY
WALKING_UP_STEEP_HILLS: NO DIFFICULTY AT ALL
SITTING_FOR_15_MINUTES: SLIGHT DIFFICULTY
LIGHT_TO_MODERATE_WORK: SLIGHT DIFFICULTY
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
## 2021-10-21 ENCOUNTER — IMMUNIZATION (OUTPATIENT)
Dept: FAMILY MEDICINE | Facility: CLINIC | Age: 46
End: 2021-10-21
Payer: COMMERCIAL

## 2021-10-21 PROCEDURE — 90471 IMMUNIZATION ADMIN: CPT

## 2021-10-21 PROCEDURE — 90686 IIV4 VACC NO PRSV 0.5 ML IM: CPT

## 2021-10-25 ENCOUNTER — THERAPY VISIT (OUTPATIENT)
Dept: PHYSICAL THERAPY | Facility: CLINIC | Age: 46
End: 2021-10-25
Payer: COMMERCIAL

## 2021-10-25 DIAGNOSIS — M25.551 HIP PAIN, RIGHT: Primary | ICD-10-CM

## 2021-10-25 PROCEDURE — 97140 MANUAL THERAPY 1/> REGIONS: CPT | Mod: GP | Performed by: PHYSICAL THERAPIST

## 2021-10-25 PROCEDURE — 97110 THERAPEUTIC EXERCISES: CPT | Mod: GP | Performed by: PHYSICAL THERAPIST

## 2021-11-24 ENCOUNTER — THERAPY VISIT (OUTPATIENT)
Dept: PHYSICAL THERAPY | Facility: CLINIC | Age: 46
End: 2021-11-24
Payer: COMMERCIAL

## 2021-11-24 DIAGNOSIS — M25.551 HIP PAIN, RIGHT: Primary | ICD-10-CM

## 2021-11-24 PROCEDURE — 97110 THERAPEUTIC EXERCISES: CPT | Mod: GP | Performed by: PHYSICAL THERAPIST

## 2021-11-24 PROCEDURE — 97140 MANUAL THERAPY 1/> REGIONS: CPT | Mod: GP | Performed by: PHYSICAL THERAPIST

## 2021-12-20 ENCOUNTER — THERAPY VISIT (OUTPATIENT)
Dept: PHYSICAL THERAPY | Facility: CLINIC | Age: 46
End: 2021-12-20
Payer: COMMERCIAL

## 2021-12-20 DIAGNOSIS — M25.551 HIP PAIN, RIGHT: Primary | ICD-10-CM

## 2021-12-20 PROCEDURE — 97110 THERAPEUTIC EXERCISES: CPT | Mod: GP | Performed by: PHYSICAL THERAPIST

## 2021-12-20 PROCEDURE — 97140 MANUAL THERAPY 1/> REGIONS: CPT | Mod: GP | Performed by: PHYSICAL THERAPIST

## 2021-12-31 ENCOUNTER — E-VISIT (OUTPATIENT)
Dept: PEDIATRICS | Facility: CLINIC | Age: 46
End: 2021-12-31
Payer: COMMERCIAL

## 2021-12-31 DIAGNOSIS — J01.90 ACUTE BACTERIAL SINUSITIS: Primary | ICD-10-CM

## 2021-12-31 DIAGNOSIS — B96.89 ACUTE BACTERIAL SINUSITIS: Primary | ICD-10-CM

## 2021-12-31 PROCEDURE — 99421 OL DIG E/M SVC 5-10 MIN: CPT | Performed by: NURSE PRACTITIONER

## 2021-12-31 RX ORDER — DOXYCYCLINE HYCLATE 100 MG
100 TABLET ORAL 2 TIMES DAILY
Qty: 14 TABLET | Refills: 0 | Status: SHIPPED | OUTPATIENT
Start: 2021-12-31 | End: 2022-01-07

## 2022-01-13 ENCOUNTER — NURSE TRIAGE (OUTPATIENT)
Dept: NURSING | Facility: CLINIC | Age: 47
End: 2022-01-13
Payer: COMMERCIAL

## 2022-01-14 NOTE — TELEPHONE ENCOUNTER
"Triage Call: Patient was exposed by son who tested positive for covid. She has no symptoms and wants to be tested for covid. Per new policy scheduling is able to place orders and schedule patients for PCR testing. Patient was connected to scheduling.     COVID 19 Nurse Triage Plan/Patient Instructions    Please be aware that novel coronavirus (COVID-19) may be circulating in the community. If you develop symptoms such as fever, cough, or SOB or if you have concerns about the presence of another infection including coronavirus (COVID-19), please contact your health care provider or visit [email or web address].     Disposition/Instructions    Additional COVID19 information to add for patients.   How can I protect others?  If you have symptoms (fever, cough, body aches or trouble breathing): Stay home and away from others (self-isolate) until:    At least 10 days have passed since your symptoms started, And     You ve had no fever--and no medicine that reduces fever--for 1 full day (24 hours), And      Your other symptoms have resolved (gotten better).     If you don t have symptoms, but a test showed that you have COVID-19 (you tested positive):    Stay home and away from others (self-isolate). Follow the tips under \"How do I self-isolate?\" below for 10 days (20 days if you have a weak immune system).    You don't need to be retested for COVID-19 before going back to school or work. As long as you're fever-free and feeling better, you can go back to school, work and other activities after waiting the 10 or 20 days.     How do I self-isolate?    Stay in your own room, even for meals. Use your own bathroom if you can.     Stay away from others in your home. No hugging, kissing or shaking hands. No visitors.    Don t go to work, school or anywhere else.     Clean  high touch  surfaces often (doorknobs, counters, handles, etc.). Use a household cleaning spray or wipes. You ll find a full list on the EPA website:  " www.epa.gov/pesticide-registration/list-n-disinfectants-use-against-sars-cov-2.    Cover your mouth and nose with a mask, tissue or washcloth to avoid spreading germs.    Wash your hands and face often. Use soap and water.    Caregivers in these groups are at risk for severe illness due to COVID-19:  o People 65 years and older  o People who live in a nursing home or long-term care facility  o People with chronic disease (lung, heart, cancer, diabetes, kidney, liver, immunologic)  o People who have a weakened immune system, including those who:  - Are in cancer treatment  - Take medicine that weakens the immune system, such as corticosteroids  - Had a bone marrow or organ transplant  - Have an immune deficiency  - Have poorly controlled HIV or AIDS  - Are obese (body mass index of 40 or higher)  - Smoke regularly    Caregivers should wear gloves while washing dishes, handling laundry and cleaning bedrooms and bathrooms.    Use caution when washing and drying laundry: Don t shake dirty laundry, and use the warmest water setting that you can.    For more tips, go to www.cdc.gov/coronavirus/2019-ncov/downloads/10Things.pdf.    How can I take care of myself?  1. Get lots of rest. Drink extra fluids (unless a doctor has told you not to).     2. Take Tylenol (acetaminophen) for fever or pain. If you have liver or kidney problems, ask your family doctor if it s okay to take Tylenol.     Adults can take either:     650 mg (two 325 mg pills) every 4 to 6 hours, or     1,000 mg (two 500 mg pills) every 8 hours as needed.     Note: Don t take more than 3,000 mg in one day.   Acetaminophen is found in many medicines (both prescribed and over-the-counter medicines). Read all labels to be sure you don t take too much.     For children, check the Tylenol bottle for the right dose. The dose is based on the child s age or weight.    3. If you have other health problems (like cancer, heart failure, an organ transplant or severe  kidney disease): Call your specialty clinic if you don t feel better in the next 2 days.    4. Know when to call 911: Emergency warning signs include:    Trouble breathing or shortness of breath    Pain or pressure in the chest that doesn t go away    Feeling confused like you haven t felt before, or not being able to wake up    Bluish-colored lips or face    What are the symptoms of COVID-19?     The most common symptoms are cough, fever and trouble breathing.     Less common symptoms include body aches, chills, diarrhea (loose, watery poops), fatigue (feeling very tired), headache, runny nose, sore throat and loss of smell.    COVID-19 can cause severe coughing (bronchitis) and lung infection (pneumonia).    How does it spread?     The virus may spread when a person coughs or sneezes into the air. The virus can travel about 6 feet this way, and it can live on surfaces.      Common  (household disinfectants) will kill the virus.    Who is at risk?  Anyone can catch COVID-19 if they re around someone who has the virus.    How can others protect themselves?     Stay away from people who have COVID-19 (or symptoms of COVID-19).    Wash hands often with soap and water. Or, use hand  with at least 60% alcohol.    Avoid touching the eyes, nose or mouth.     Wear a face mask when you go out in public, when sick or when caring for a sick person.    Where can I get more information?    [facility]: About COVID-19: [email or web address]    CDC: What to Do If You re Sick: www.cdc.gov/coronavirus/2019-ncov/about/steps-when-sick.html    CDC: Ending Home Isolation: www.cdc.gov/coronavirus/2019-ncov/hcp/disposition-in-home-patients.html     CDC: Caring for Someone: www.cdc.gov/coronavirus/2019-ncov/if-you-are-sick/care-for-someone.html     [facility]: Interim Guidance for Hospital Discharge to Home: www.health.[facility].[place].us/diseases/coronavirus/hcp/hospdischarge.pdf    [facility]  trials (COVID-19 research studies): clinicalaffairs.[facility]/n-clinical-trials     Below are the COVID-19 hotlines at the [place] Department of Health ([facility]). Interpreters are available.   o For health questions: Call 241-021-4556 or 1-669.727.3290 (7 a.m. to 7 p.m.)  o For questions about schools and childcare: Call 707-821-4948 or 1-505.438.4905 (7 a.m. to 7 p.m.)    Thank you for taking steps to prevent the spread of this virus.  o Limit your contact with others.  o Wear a simple mask to cover your cough.  o Wash your hands well and often.    Resources    [facility]: About COVID-19: [email or web address]    CDC: What to Do If You're Sick: www.cdc.gov/coronavirus/2019-ncov/about/steps-when-sick.html    CDC: Ending Home Isolation: www.cdc.gov/coronavirus/2019-ncov/hcp/disposition-in-home-patients.html     CDC: Caring for Someone: www.cdc.gov/coronavirus/2019-ncov/if-you-are-sick/care-for-someone.html     [facility]: Interim Guidance for Hospital Discharge to Home: www.[facility].mn./diseases/coronavirus/hcp/hospdischarge.pdf    [facility] clinical trials (COVID-19 research studies): [email or web address].[facility]-clinical-trials     Below are the COVID-19 hotlines at the [place] Department of [facility] ([facility]). Interpreters are available.   o For health questions: Call 092-635-1855 or 1-909.655.4726 (7 a.m. to 7 p.m.)  o For questions about schools and childcare: Call 323-048-7656 or 1-837.629.9564 (7 a.m. to 7 p.m.)     Angelita Dudley RN Nursing Advisor 1/13/2022 6:25 PM     Reason for Disposition    [1] CLOSE CONTACT COVID-19 EXPOSURE within last 14 days AND [2] NO symptoms    Additional Information    Negative: COVID-19 lab test positive    Negative: [1] Lives with someone known to have influenza (flu test positive) AND [2] flu-like symptoms (e.g., cough, runny nose, sore throat, SOB; with or without fever)    Negative: [1] Symptoms of COVID-19 (e.g., cough, fever, SOB, or others) AND [2]  HCP diagnosed COVID-19 based on symptoms    Negative: [1] Symptoms of COVID-19 (e.g., cough, fever, SOB, or others) AND [2] lives in an area with community spread    Negative: [1] Symptoms of COVID-19 (e.g., cough, fever, SOB, or others) AND [2] within 14 days of EXPOSURE (close contact) with diagnosed or suspected COVID-19 patient    Negative: [1] Symptoms of COVID-19 (e.g., cough, fever, SOB, or others) AND [2] within 14 days of travel from high-risk area for COVID-19 community spread (identified by CDC)    Negative: [1] Difficulty breathing (shortness of breath) occurs AND [2] onset > 14 days after COVID-19 EXPOSURE (Close Contact)    Negative: [1] Dry cough occurs AND [2] onset > 14 days after COVID-19 EXPOSURE    Negative: [1] Wet cough (i.e., white-yellow, yellow, green, or nae colored sputum) AND [2] onset > 14 days after COVID-19 EXPOSURE    Negative: [1] Common cold symptoms AND [2] onset > 14 days after COVID-19 EXPOSURE    Negative: COVID-19 vaccine reaction suspected (e.g., fever, headache, muscle aches) occurring during days 1-3 after getting vaccine    Negative: COVID-19 vaccine, questions about    Negative: [1] CLOSE CONTACT COVID-19 EXPOSURE within last 14 days AND [2] needs COVID-19 lab test to return to work AND [3] NO symptoms    Negative: [1] CLOSE CONTACT COVID-19 EXPOSURE within last 14 days AND [2] exposed person is a  (e.g., police or paramedic) AND [3] NO symptoms    Negative: [1] CLOSE CONTACT COVID-19 EXPOSURE within last 14 days AND [2] exposed person is a healthcare worker who was NOT using all recommended personal protective equipment (e.g., a respirator-N95 mask, eye protection, gloves, and gown) AND [3] NO symptoms    Negative: [1] Living or working in a correctional facility, long-term care facility, or shelter (i.e., congregate setting; densely populated) AND [2] where an outbreak has occurred AND [3] NO symptoms    Protocols used: CORONAVIRUS (COVID-19)  EXPOSURE-A- 8.25.2021

## 2022-03-05 ENCOUNTER — HEALTH MAINTENANCE LETTER (OUTPATIENT)
Age: 47
End: 2022-03-05

## 2022-03-09 ENCOUNTER — TRANSFERRED RECORDS (OUTPATIENT)
Dept: MULTI SPECIALTY CLINIC | Facility: CLINIC | Age: 47
End: 2022-03-09

## 2022-03-09 LAB
HPV ABSTRACT: NORMAL
PAP SMEAR - HIM PATIENT REPORTED: NEGATIVE

## 2022-05-04 ENCOUNTER — OFFICE VISIT (OUTPATIENT)
Dept: FAMILY MEDICINE | Facility: CLINIC | Age: 47
End: 2022-05-04
Payer: COMMERCIAL

## 2022-05-04 VITALS
SYSTOLIC BLOOD PRESSURE: 126 MMHG | TEMPERATURE: 99.4 F | RESPIRATION RATE: 14 BRPM | DIASTOLIC BLOOD PRESSURE: 82 MMHG | BODY MASS INDEX: 28.19 KG/M2 | WEIGHT: 176 LBS | HEART RATE: 76 BPM

## 2022-05-04 DIAGNOSIS — M25.551 HIP PAIN, RIGHT: Primary | ICD-10-CM

## 2022-05-04 DIAGNOSIS — M54.2 CERVICALGIA: ICD-10-CM

## 2022-05-04 DIAGNOSIS — S13.4XXA WHIPLASH INJURY TO NECK, INITIAL ENCOUNTER: ICD-10-CM

## 2022-05-04 PROCEDURE — 99214 OFFICE O/P EST MOD 30 MIN: CPT | Performed by: FAMILY MEDICINE
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
## 2022-05-04 NOTE — NURSING NOTE
The following medication was given:     MEDICATION: Kenalog 40 mg/mL   ROUTE: Intra-articular  SITE: Right hip  DOSE: 40 mg/mL   LOT #: [id number]  :  [employer]  EXPIRATION DATE:  11/2023  NDC#: [address]-0    Kenalog mixed with lidocaine with epi 1:100,000 (Lot# -[id number], Exp. 8/1/22, NDC#3004-3552-53)    Ana Mays CMA ([facility])

## 2022-05-04 NOTE — NURSING NOTE
"Initial /82 (BP Location: Right arm, Patient Position: Chair, Cuff Size: Adult Regular)   Pulse 76   Temp 99.4  F (37.4  C) (Tympanic)   Resp 14   Wt 79.8 kg (176 lb)   LMP 04/20/2022 (Within Days)   Breastfeeding No   BMI 28.19 kg/m   Estimated body mass index is 28.19 kg/m  as calculated from the following:    Height as of 9/20/21: 1.683 m (5' 6.25\").    Weight as of this encounter: 79.8 kg (176 lb). .    Ana Mays CMA ([facility])    "

## 2022-06-20 ENCOUNTER — ANCILLARY PROCEDURE (OUTPATIENT)
Dept: MAMMOGRAPHY | Facility: CLINIC | Age: 47
End: 2022-06-20
Attending: OBSTETRICS & GYNECOLOGY
Payer: COMMERCIAL

## 2022-06-20 DIAGNOSIS — Z12.31 VISIT FOR SCREENING MAMMOGRAM: ICD-10-CM

## 2022-06-20 PROCEDURE — 77067 SCR MAMMO BI INCL CAD: CPT

## 2022-07-01 ENCOUNTER — TELEPHONE (OUTPATIENT)
Dept: FAMILY MEDICINE | Facility: CLINIC | Age: 47
End: 2022-07-01

## 2022-07-01 NOTE — TELEPHONE ENCOUNTER
Patient Quality Outreach    Patient is due for the following:   Colon Cancer Screening -  Colonoscopy  Cervical Cancer Screening - PAP Needed  IVD  -  LDL (Fasting)  Physical  - none on file  Immunizations  -  Covid    NEXT STEPS:   Schedule a yearly physical and pap    Type of outreach:    Phone, spoke to patient/parent. patient had pap done at [facility] on 03/09/2022      Questions for provider review:    None     Amparo Rolon  Chart routed to Amparo Rolon CMA.

## 2022-07-08 ENCOUNTER — NURSE TRIAGE (OUTPATIENT)
Dept: NURSING | Facility: CLINIC | Age: 47
End: 2022-07-08

## 2022-07-08 NOTE — TELEPHONE ENCOUNTER
"Pt calling after tripping over a pile of clothes and falling 25 minutes ago    States she hit her head on a wall as she was going down     Minor pain and redness noted but states she has been using ice    Denies any scrapes or open injuries and does not see any swelling     Pt advised on home care and reviewed symptoms that she should call back for or seek further medical attention for     Pt verbalizes understanding and is agreeable to plan       Reason for Disposition    Scalp swelling, bruise or pain    Additional Information    Negative: [1] ACUTE NEURO SYMPTOM AND [2] present now  (DEFINITION: difficult to awaken OR confused thinking and talking OR slurred speech OR weakness of arms OR unsteady walking)    Negative: Knocked out (unconscious) > 1 minute    Negative: Seizure (convulsion) occurred  (Exception: prior history of seizures and now alert and without Acute Neuro Symptoms)    Negative: Penetrating head injury (e.g., knife, gun shot wound, metal object)    Negative: [1] Major bleeding (e.g., actively dripping or spurting) AND [2] can't be stopped    Negative: [1] Dangerous mechanism of injury (e.g., MVA, diving, trampoline, contact sports, fall > 10 feet or 3 meters) AND [2] NECK pain AND [3] began < 1 hour after injury    Negative: Sounds like a life-threatening emergency to the triager    Negative: [1] Diagnosed with concussion AND [2] within last 14 days    Negative: [1] Traumatic brain injury (mTBI; concussion) AND [2] more than 14 days since head injury    Negative: Can't remember what happened (amnesia)    Negative: Vomiting once or more    Negative: [1] Loss of vision or double vision AND [2] present now    Negative: Watery or blood-tinged fluid dripping from the NOSE or EARS now  (Exception: tears from crying or nosebleed from nasal trauma)    Negative: [1] One or two \"black eyes\" (bruising, purple color of eyelids) AND [2] onset within 24 hours of head injury    Negative: Large swelling or bruise " > 2 inches (5 cm)    Negative: Skin is split open or gaping  (or length > 1/2 inch or 12 mm)    Negative: [1] Bleeding AND [2] won't stop after 10 minutes of direct pressure (using correct technique)    Negative: Sounds like a serious injury to the triager    Negative: [1] ACUTE NEURO SYMPTOM AND [2] now fine  (DEFINITION: difficult to awaken OR confused thinking and talking OR slurred speech OR weakness of arms OR unsteady walking)    Negative: [1] Knocked out (unconscious) < 1 minute AND [2] now fine    Negative: [1] SEVERE headache AND [2] not improved 2 hours after pain medicine/ice packs    Negative: Dangerous injury (e.g., MVA, diving, trampoline, contact sports, fall > 10 feet or 3 meters) or severe blow from hard object (e.g., golf club or baseball bat)    Negative: Taking Coumadin (warfarin) or other strong blood thinner, or known bleeding disorder (e.g., thrombocytopenia)    Negative: Suspicious history for the injury    Negative: [1] Age over 65 years AND [2] swelling or bruise    Negative: Patient is confused or is an unreliable provider of information (e.g., dementia, severe intellectual disability, alcohol intoxication)    Negative: [1] No prior tetanus shots (or is not fully vaccinated) AND [2] any wound (e.g., cut, scrape)    Negative: [1] HIV positive or severe immunodeficiency (severely weak immune system) AND [2] DIRTY cut or scrape    Negative: [1] Last tetanus shot > 5 years ago AND [2] DIRTY cut or scrape    Negative: [1] Last tetanus shot > 10 years ago AND [2] CLEAN cut or scrape (e.g., object AND skin were clean)    Negative: [1] After 72 hours AND [2] headache persists    Protocols used: HEAD INJURY-A-AH    COVID 19 Nurse Triage Plan/Patient Instructions    Please be aware that novel coronavirus (COVID-19) may be circulating in the community. If you develop symptoms such as fever, cough, or SOB or if you have concerns about the presence of another infection including coronavirus (COVID-19),  please contact your health care provider or visit [email or web address].     Disposition/Instructions    Home care recommended. Follow home care protocol based instructions.    Thank you for taking steps to prevent the spread of this virus.  o Limit your contact with others.  o Wear a simple mask to cover your cough.  o Wash your hands well and often.    Resources    [facility]: About COVID-19: [email or web address]    CDC: What to Do If You're Sick: www.cdc.gov/coronavirus/2019-ncov/about/steps-when-sick.html    CDC: Ending Home Isolation: www.cdc.gov/coronavirus/2019-ncov/hcp/disposition-in-home-patients.html     CDC: Caring for Someone: www.cdc.gov/coronavirus/2019-ncov/if-you-are-sick/care-for-someone.html     [facility]: Interim Guidance for Hospital Discharge to Home: www.[email or web address].[email or web address]/[email or web address]/[email or web address]/hcp/hospdischarge.pdf    [facility] clinical trials (COVID-19 research studies): clinicalaffairs.[facility]-clinical-trials     Below are the COVID-19 hotlines at the [place] [facility] ([facility]). Interpreters are available.   o For health questions: Call 245-080-5506 or 1-828.970.7681 (7 a.m. to 7 p.m.)  o For questions about schools and childcare: Call 048-141-5257 or 1-838.117.6662 (7 a.m. to 7 p.m.)         Juhi Jenkins RN [facility] July 8, 2022 5:44 PM

## 2022-10-20 ENCOUNTER — IMMUNIZATION (OUTPATIENT)
Dept: FAMILY MEDICINE | Facility: CLINIC | Age: 47
End: 2022-10-20
Payer: COMMERCIAL

## 2022-10-20 DIAGNOSIS — Z23 NEED FOR PROPHYLACTIC VACCINATION AND INOCULATION AGAINST INFLUENZA: Primary | ICD-10-CM

## 2022-10-20 PROCEDURE — 90471 IMMUNIZATION ADMIN: CPT

## 2022-10-20 PROCEDURE — 90686 IIV4 VACC NO PRSV 0.5 ML IM: CPT

## 2022-10-20 PROCEDURE — 99207 PR NO CHARGE NURSE ONLY: CPT

## 2022-11-10 ENCOUNTER — APPOINTMENT (OUTPATIENT)
Dept: GENERAL RADIOLOGY | Facility: CLINIC | Age: 47
End: 2022-11-10
Attending: FAMILY MEDICINE
Payer: COMMERCIAL

## 2022-11-10 ENCOUNTER — HOSPITAL ENCOUNTER (EMERGENCY)
Facility: CLINIC | Age: 47
Discharge: HOME | End: 2022-11-10
Attending: FAMILY MEDICINE | Admitting: FAMILY MEDICINE
Payer: COMMERCIAL

## 2022-11-10 ENCOUNTER — NURSE TRIAGE (OUTPATIENT)
Dept: FAMILY MEDICINE | Facility: CLINIC | Age: 47
End: 2022-11-10

## 2022-11-10 VITALS
TEMPERATURE: 98.6 F | WEIGHT: 170 LBS | RESPIRATION RATE: 16 BRPM | HEIGHT: 66 IN | OXYGEN SATURATION: 99 % | BODY MASS INDEX: 27.32 KG/M2 | SYSTOLIC BLOOD PRESSURE: 155 MMHG | DIASTOLIC BLOOD PRESSURE: 102 MMHG | HEART RATE: 89 BPM

## 2022-11-10 DIAGNOSIS — R00.2 PALPITATIONS: ICD-10-CM

## 2022-11-10 DIAGNOSIS — R07.89 NON-CARDIAC CHEST PAIN: ICD-10-CM

## 2022-11-10 LAB
ALBUMIN SERPL BCG-MCNC: 4.6 G/DL (ref 3.5–5.2)
ALP SERPL-CCNC: 62 U/L (ref 35–104)
ALT SERPL W P-5'-P-CCNC: 16 U/L (ref 10–35)
ANION GAP SERPL CALCULATED.3IONS-SCNC: 12 MMOL/L (ref 7–15)
AST SERPL W P-5'-P-CCNC: 22 U/L (ref 10–35)
BILIRUB SERPL-MCNC: 0.4 MG/DL
BUN SERPL-MCNC: 11 MG/DL (ref 6–20)
CALCIUM SERPL-MCNC: 9.7 MG/DL (ref 8.6–10)
CHLORIDE SERPL-SCNC: 103 MMOL/L (ref 98–107)
CREAT SERPL-MCNC: 0.73 MG/DL (ref 0.51–0.95)
D DIMER PPP FEU-MCNC: <0.27 UG/ML FEU (ref 0–0.5)
DEPRECATED HCO3 PLAS-SCNC: 26 MMOL/L (ref 22–29)
GFR SERPL CREATININE-BSD FRML MDRD: >90 ML/MIN/1.73M2
GLUCOSE SERPL-MCNC: 105 MG/DL (ref 70–99)
HCG SERPL QL: NEGATIVE
HOLD SPECIMEN: NORMAL
LIPASE SERPL-CCNC: 41 U/L (ref 13–60)
POTASSIUM SERPL-SCNC: 3.9 MMOL/L (ref 3.4–5.3)
PROT SERPL-MCNC: 7.6 G/DL (ref 6.4–8.3)
SODIUM SERPL-SCNC: 141 MMOL/L (ref 136–145)
TROPONIN T SERPL HS-MCNC: <6 NG/L
TSH SERPL DL<=0.005 MIU/L-ACNC: 2.79 UIU/ML (ref 0.3–4.2)

## 2022-11-10 PROCEDURE — 36415 COLL VENOUS BLD VENIPUNCTURE: CPT | Performed by: FAMILY MEDICINE

## 2022-11-10 PROCEDURE — 71046 X-RAY EXAM CHEST 2 VIEWS: CPT

## 2022-11-10 PROCEDURE — 83690 ASSAY OF LIPASE: CPT | Performed by: FAMILY MEDICINE

## 2022-11-10 PROCEDURE — 99285 EMERGENCY DEPT VISIT HI MDM: CPT | Mod: 25 | Performed by: FAMILY MEDICINE

## 2022-11-10 PROCEDURE — 84484 ASSAY OF TROPONIN QUANT: CPT | Performed by: EMERGENCY MEDICINE

## 2022-11-10 PROCEDURE — 84703 CHORIONIC GONADOTROPIN ASSAY: CPT | Performed by: FAMILY MEDICINE

## 2022-11-10 PROCEDURE — 250N000013 HC RX MED GY IP 250 OP 250 PS 637: Performed by: EMERGENCY MEDICINE

## 2022-11-10 PROCEDURE — 93010 ELECTROCARDIOGRAM REPORT: CPT | Mod: 59 | Performed by: FAMILY MEDICINE

## 2022-11-10 PROCEDURE — 84443 ASSAY THYROID STIM HORMONE: CPT | Performed by: EMERGENCY MEDICINE

## 2022-11-10 PROCEDURE — 85379 FIBRIN DEGRADATION QUANT: CPT | Performed by: FAMILY MEDICINE

## 2022-11-10 PROCEDURE — 93005 ELECTROCARDIOGRAM TRACING: CPT | Performed by: FAMILY MEDICINE

## 2022-11-10 PROCEDURE — 80053 COMPREHEN METABOLIC PANEL: CPT | Performed by: FAMILY MEDICINE

## 2022-11-10 RX ORDER — METOPROLOL TARTRATE 25 MG/1
25 TABLET, FILM COATED ORAL 2 TIMES DAILY PRN
Qty: 60 TABLET | Refills: 0 | Status: SHIPPED | OUTPATIENT
Start: 2022-11-10 | End: 2022-11-10

## 2022-11-10 RX ORDER — ASPIRIN 81 MG/1
324 TABLET, CHEWABLE ORAL ONCE
Status: COMPLETED | OUTPATIENT
Start: 2022-11-10 | End: 2022-11-10

## 2022-11-10 RX ORDER — METOPROLOL TARTRATE 25 MG/1
25 TABLET, FILM COATED ORAL 2 TIMES DAILY PRN
Qty: 60 TABLET | Refills: 0 | Status: SHIPPED | OUTPATIENT
Start: 2022-11-10 | End: 2022-12-26

## 2022-11-10 RX ADMIN — ASPIRIN 81 MG 324 MG: 81 TABLET ORAL at 16:02

## 2022-11-10 ASSESSMENT — ACTIVITIES OF DAILY LIVING (ADL): ADLS_ACUITY_SCORE: 35

## 2022-11-10 ASSESSMENT — ENCOUNTER SYMPTOMS
SORE THROAT: 0
DYSURIA: 0
HEADACHES: 0
ABDOMINAL PAIN: 0
WHEEZING: 0
DIARRHEA: 0
FREQUENCY: 0
SINUS PRESSURE: 0
NAUSEA: 0
FEVER: 0
CHILLS: 0
VOMITING: 0
SHORTNESS OF BREATH: 1
COUGH: 0
PALPITATIONS: 1
CONSTIPATION: 0
DIAPHORESIS: 0
BLOOD IN STOOL: 0

## 2022-11-10 NOTE — DISCHARGE INSTRUCTIONS
ICD-10-CM    1. Palpitations  R00.2     I suspect this is PVCs based on my evaluation, although we ddi not have you on monitor during these, but the history and exxam fit with this/.  stay hydrated. avoid caffeine.  consider zio monitor if this persists.  monitor BP which was mildly elevated today.  return for worsening. you may use metoprolol 25 mg orally for bothersome palpitations. typically these resolve without intervention      2. Non-cardiac chest pain  R07.89     no serious findings on evaluation.  follow-up primary provider this next week.  return for exertional chest pain

## 2022-11-10 NOTE — TELEPHONE ENCOUNTER
Nurse Triage SBAR    Is this a 2nd Level Triage? YES, LICENSED PRACTITIONER REVIEW IS REQUIRED    Situation: Irregular heartbeats    Background: Patient has noticed an increase in irregular heart beat occurences.  She states she mostly notices them when she is quiet and paying attention.  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      Assessment: Should be seen today.  No openings.  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      Protocol Recommended Disposition:   See in Office Today    Recommendation: Do you want to work patient in today, or after  visit for follow up?     Routed to provider    Does the patient meet one of the following criteria for ADS visit consideration? 16+ years old, with an [facility] PCP     TIP  Providers, please consider if this condition is appropriate for management at one of our Acute and Diagnostic Services sites.     If patient is a good candidate, please use dotphrase <dot>triageresponse and select Refer to ADS to document.    Reason for Disposition    Skipped or extra beat(s) and increases with exercise or exertion    Patient wants to be seen    Additional Information    Negative: Passed out (i.e., lost consciousness, collapsed and was not responding)    Negative: Shock suspected (e.g., cold/pale/clammy skin, too weak to stand, low BP, rapid pulse)    Negative: Difficult to awaken or acting confused (e.g., disoriented, slurred speech)    Negative: Visible sweat on face or sweat dripping down face    Negative: Unable to walk, or can only walk with assistance (e.g., requires support)    Negative: Received SHOCK from implantable cardiac defibrillator and has persisting symptoms (i.e., palpitations, lightheadedness)    Negative: Dizziness, lightheadedness, or weakness and heart beating very rapidly (e.g., > 140 / minute)    Negative: Dizziness, lightheadedness, or weakness and  heart beating very slowly (e.g., < 50 / minute)    Negative: Sounds like a life-threatening emergency to the triager    Negative: Chest pain    Negative: Difficulty breathing    Negative: Dizziness, lightheadedness, or weakness    Negative: Heart beating very rapidly (e.g., > 140 / minute) and present now  (Exception: During exercise.)    Negative: Heart beating very slowly (e.g., < 50 / minute)  (Exception: Athlete and heart rate normal for caller.)    Negative: New or worsened shortness of breath with activity (dyspnea on exertion)    Negative: Patient sounds very sick or weak to the triager    Negative: Wearing a 'Holter monitor' or 'cardiac event monitor'    Negative: Received SHOCK from implantable cardiac defibrillator (and now feels well)    Negative: Heart beating very rapidly (e.g., > 140 / minute) and not present now  (Exception: During exercise.)    Negative: Skipped or extra beat(s) and occurs 4 or more times per minute    Negative: History of heart disease (i.e., heart attack, bypass surgery, angina, angioplasty)  (Exception: Brief heartbeat symptoms that went away and now feels well.)    Negative: Age > 60 years  (Exception: Brief heartbeat symptoms that went away and now feels well.)    Negative: Taking water pill (i.e., diuretic) or heart medication (e.g., digoxin)    Protocols used: HEART RATE AND HEARTBEAT [id number]

## 2022-11-10 NOTE — ED TRIAGE NOTES
Palpitations since Sunday; becoming more frequent, also left sided intermittent chest pain that radiates through to her back     Triage Assessment     Row Name 11/10/22 1246       Triage Assessment (Adult)    Airway WDL WDL       Cardiac WDL    Cardiac WDL X;chest pain       Cognitive/Neuro/Behavioral WDL    Cognitive/Neuro/Behavioral WDL WDL

## 2022-11-10 NOTE — ED PROVIDER NOTES
History     Chief Complaint   Patient presents with     Palpitations     Palpitations since Sunday; becoming more frequent     Chest Pain     Left side chest pain that radiates through chest into back      HPI  Hannah Perdomo is a 47 year old female who presents with palpitations onset on Sunday, she now more frequent.  Typically walks her dogs and a couple days ago when she last walked them she had no significant symptoms with that no increased sense of dyspnea or chest pain.  She notes that at rest she will have palpitations frequently.  These tend to take her breath away most consistently with a PVC like event.  But she also has a separate chest pressure left side radiating to the left lateral chest.  There is no associated nausea vomiting or sweats.  There is no exertional symptoms with this.  No significant cough.   May affect her deep breathing.  She has use of oral contraceptives.  No tobacco or significant alcohol use.  No prolonged travel cancer history for her or prolonged sitting.  She does have a  who has cancer and she is stressed related to this.  .    Allergies:  Allergies   Allergen Reactions     Pcn [Penicillins]      When was a baby     Sulfa Drugs      rash       Problem List:    Patient Active Problem List    Diagnosis Date Noted     CARDIOVASCULAR SCREENING; LDL GOAL LESS THAN 160 10/31/2010     Priority: Medium     Health Care Home 06/03/2013     Priority: Low     EMERGENCY CARE PLAN  Traci 3, 2013: No current Care Coordination follow up planned. Please refer if Care Coordination services are needed.    Presenting Problem Signs and Symptoms Treatment Plan   Questions or concerns   during clinic hours   I will call my clinic directly:  [facility]  [id number] [place] [id number] [phone].    Questions or concerns outside clinic hours   I will call the 24 hour nurse line at   [phone] or [phone].   Need to schedule an appointment   I will call the 24  hour scheduling team at [phone] or my clinic directly at [phone].    Same day treatment     I will call my clinic first, nurse line if after hours, urgent care and express care if needed.   Clinic care coordination services (regular clinic hours)     I will call a clinic care coordinator directly:     Syd Haro RN  Mon, Tues, Fri - [phone]  Wed, Thurs - [phone]    Nely Hernandez :    [phone]    Or call my clinic at [phone] and ask to speak with care coordination.   Crisis Services: Behavioral or Mental Health  [facility] 24 Hour Phone Line  [phone]    [facility] 24 Hour Crisis Services  [phone]    [facility] (Behavioral Health Providers) Network [phone]    [facility]   [phone]       Emergency treatment -- Immediately    [facility]All 911             Past Medical History:    No past medical history on file.    Past Surgical History:    Past Surgical History:   Procedure Laterality Date     HC RECONSTR NOSE+ASPEN SEPTAL REPAIR       ZZC APPENDECTOMY         Family History:    Family History   Problem Relation Age of Onset     Diabetes Father      Cerebrovascular Disease Father      Parkinsonism Mother      Cerebrovascular Disease Mother        Social History:  Marital Status:   [2]  Social History     Tobacco Use     Smoking status: Never     Smokeless tobacco: Never   Substance Use Topics     Alcohol use: Yes     Comment: 3 drinks per week     Drug use: No        Medications:    fluticasone (FLONASE) 50 MCG/ACT spray  LORYNA 3-0.02 MG tablet  MAGNESIUM PO  Multiple Vitamin (MULTI-VITAMIN) per tablet  Omega-3 Fatty Acids (OMEGA-3 FISH OIL PO)  VITAMIN D PO  ZYRTEC 10 MG OR TABS          Review of Systems   Constitutional: Negative for chills, diaphoresis and fever.   HENT: Negative for ear pain, sinus pressure and sore throat.    Eyes: Negative for visual disturbance.   Respiratory: Positive for shortness of breath. Negative for cough and  "wheezing.    Cardiovascular: Positive for chest pain and palpitations.   Gastrointestinal: Negative for abdominal pain, blood in stool, constipation, diarrhea, nausea and vomiting.   Genitourinary: Negative for dysuria, frequency and urgency.   Skin: Negative for rash.   Neurological: Negative for headaches.   All other systems reviewed and are negative.      Physical Exam   BP: (!) 155/102  Pulse: 89  Temp: 98.6  F (37  C)  Resp: 16  Height: 167.6 cm (5' 6\")  Weight: 77.1 kg (170 lb)  SpO2: 99 %      Physical Exam  Constitutional:       General: She is in acute distress.      Appearance: She is not diaphoretic.   HENT:      Head: Atraumatic.   Eyes:      Conjunctiva/sclera: Conjunctivae normal.   Cardiovascular:      Rate and Rhythm: Normal rate and regular rhythm.      Heart sounds: No murmur heard.  Pulmonary:      Effort: Pulmonary effort is normal. No respiratory distress.      Breath sounds: Normal breath sounds. No stridor. No wheezing or rhonchi.   Abdominal:      General: Abdomen is flat. There is no distension.      Palpations: Abdomen is soft. There is no mass.      Tenderness: There is no abdominal tenderness. There is no guarding.   Musculoskeletal:      Cervical back: Neck supple.      Right lower leg: No edema.      Left lower leg: No edema.   Skin:     Coloration: Skin is not pale.      Findings: No rash.   Neurological:      General: No focal deficit present.      Mental Status: She is alert.      Motor: No weakness.       Follows checking the patient's pulse I did detect a PVC like event although I do not have her on a monitor but she had a skipped beat during that time.    ED Course                 Procedures                EKG Interpretation:      Interpreted by Ben Huerta MD  EKG done at 1309 hrs. demonstrates a sinus rhythm 76 bpm normal axis.  There is no obvious ST changes on this EKG on my read.  There are no T wave changes other than T wave flattening inferiorly.  There are normal R " wave progression and no Q waves.  Normal intervals.  Normal conduction.  No ectopy.  The MO is slightly short at 116 but there is no delta wave.  Impression sinus rhythm 76 bpm with no obvious ST or T wave change on my exam.  There is a slightly short MO interval but no delta wave.  Note that this is different than her EKG automated reading of ST depression which is incorrect by my read  EKG is identical to the one done in 2016.    Critical Care time:  none               Results for orders placed or performed during the hospital encounter of 11/10/22 (from the past 24 hour(s))   [facility] Draw *Canceled*    Narrative    The following orders were created for panel order [facility] Draw.  Procedure                               Abnormality         Status                     ---------                               -----------         ------                       Please view results for these tests on the individual orders.   Troponin T, High Sensitivity   Result Value Ref Range    Troponin T, High Sensitivity <6 <=14 ng/L   TSH with free T4 reflex   Result Value Ref Range    TSH 2.79 0.30 - 4.20 uIU/mL   HCG qualitative pregnancy (blood)   Result Value Ref Range    hCG Serum Qualitative Negative Negative   Lipase   Result Value Ref Range    Lipase 41 13 - 60 U/L   Comprehensive metabolic panel   Result Value Ref Range    Sodium 141 136 - 145 mmol/L    Potassium 3.9 3.4 - 5.3 mmol/L    Chloride 103 98 - 107 mmol/L    Carbon Dioxide (CO2) 26 22 - 29 mmol/L    Anion Gap 12 7 - 15 mmol/L    Urea Nitrogen 11.0 6.0 - 20.0 mg/dL    Creatinine 0.73 0.51 - 0.95 mg/dL    Calcium 9.7 8.6 - 10.0 mg/dL    Glucose 105 (H) 70 - 99 mg/dL    Alkaline Phosphatase 62 35 - 104 U/L    AST 22 10 - 35 U/L    ALT 16 10 - 35 U/L    Protein Total 7.6 6.4 - 8.3 g/dL    Albumin 4.6 3.5 - 5.2 g/dL    Bilirubin Total 0.4 <=1.2 mg/dL    GFR Estimate >90 >60 mL/min/1.73m2   D dimer quantitative   Result Value Ref Range    D-Dimer Quantitative <0.27 0.00 -  0.50 ug/mL FEU    Narrative    This D-dimer assay is intended for use in conjunction with a clinical pretest probability assessment model to exclude pulmonary embolism (PE) and deep venous thrombosis (DVT) in outpatients suspected of PE or DVT. The cut-off value is 0.50 ug/mL FEU.   [facility] Draw    Narrative    The following orders were created for panel order [facility] Draw.  Procedure                               Abnormality         Status                     ---------                               -----------         ------                     Extra Red Top Tube[[id number]]                                                            Please view results for these tests on the individual orders.   [facility] Draw    Narrative    The following orders were created for panel order [facility] Draw.  Procedure                               Abnormality         Status                     ---------                               -----------         ------                     Extra Purple Top Tube[[id number]]                            Final result                 Please view results for these tests on the individual orders.   Extra Purple Top Tube   Result Value Ref Range    Hold Specimen JIC    XR Chest 2 Views    Narrative    EXAM: XR CHEST 2 VIEWS  LOCATION: [facility]  DATE/TIME: 11/10/2022 4:34 PM    INDICATION: chest pain  COMPARISON: Chest and left rib radiographs 7/13/2016      Impression    IMPRESSION: Negative chest.       Medications   aspirin (ASA) chewable tablet 324 mg (has no administration in time range)       Assessments & Plan (with Medical Decision Making)     MDM: Hannah Perdomo is a 47 year old female who presents with palpitations most consistent with PVCs but she also has a lateral left chest pressure and sense of dyspnea with this and is on oral contraceptive.  Broad-based testing is indicated.  I will check TSH related to her palpitations as well as CBC chemistry panel but also  check for etiology for the left pain that relatively mild most of her symptoms today are related to palpitations and her symptoms do not appear to be exertional.  Her EKG is reassuring.    I have reviewed the nursing notes.    I have reviewed the findings, diagnosis, plan and need for follow up with the patient.       New Prescriptions    No medications on file       Final diagnoses:   Palpitations - I suspect this is PVCs based on my evaluation, although we ddi not have you on monitor during these, but the history and exxam fit with this/.  stay hydrated. avoid caffeine.  consider zio monitor if this persists.  monitor BP which was mildly elevated today.  return for worsening. you may use metoprolol 25 mg orally for bothersome palpitations. typically these resolve without intervention   Non-cardiac chest pain - no serious findings on evaluation.  follow-up primary provider this next week.  return for exertional chest pain       11/10/2022   [facility] EMERGENCY DEPT     Ben Huerta MD  11/10/22 5253

## 2022-11-17 ENCOUNTER — OFFICE VISIT (OUTPATIENT)
Dept: FAMILY MEDICINE | Facility: CLINIC | Age: 47
End: 2022-11-17
Payer: COMMERCIAL

## 2022-11-17 VITALS
WEIGHT: 179 LBS | DIASTOLIC BLOOD PRESSURE: 82 MMHG | TEMPERATURE: 99.1 F | OXYGEN SATURATION: 100 % | HEIGHT: 66 IN | HEART RATE: 68 BPM | SYSTOLIC BLOOD PRESSURE: 122 MMHG | BODY MASS INDEX: 28.77 KG/M2

## 2022-11-17 DIAGNOSIS — R00.2 PALPITATIONS: Primary | ICD-10-CM

## 2022-11-17 DIAGNOSIS — F43.9 STRESS: ICD-10-CM

## 2022-11-17 DIAGNOSIS — I83.811 VARICOSE VEINS OF LOWER EXTREMITY WITH PAIN, RIGHT: ICD-10-CM

## 2022-11-17 PROCEDURE — 99214 OFFICE O/P EST MOD 30 MIN: CPT | Performed by: FAMILY MEDICINE

## 2022-11-17 RX ORDER — IVERMECTIN 10 MG/G
CREAM TOPICAL PRN
COMMUNITY
Start: 2022-09-02 | End: 2023-12-10

## 2022-11-17 NOTE — PROGRESS NOTES
"  Assessment & Plan     Palpitations  She is tolerating the metoprolol very well.  This is decreased greatly she is not having any at night even though she is just taking the metoprolol tartrate in the morning.  We did discuss the nature of PACs and PVCs.    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  This is likely contributing to the frequency of the palpitations.  We reviewed her labs from the ER everything looks great.  If she is to start having these increase in the metoprolol is not helping I would consider doing a Zio patch.       MED REC REQUIRED  Post Medication Reconciliation Status: discharge medications reconciled and changed, per note/orders  BMI:   Estimated body mass index is 28.89 kg/m  as calculated from the following:    Height as of this encounter: 1.676 m (5' 6\").    Weight as of this encounter: 81.2 kg (179 lb).       3. 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  She is going to go  some 20 to 30 mm hg ones      FUTURE APPOINTMENTS:       - Follow-up for annual visit or as needed    No follow-ups on file.    Nicki Ramos MD  [facility] [place] is a 47 year old, presenting for the following health issues:  ER F/U      History of Present Illness       Reason for visit:  Follow up    She eats 2-3 servings of fruits and vegetables daily.She consumes 0 sweetened beverage(s) daily.She exercises with enough effort to increase her heart rate 30 to 60 minutes per day.    She is taking medications regularly.       ED/UC Followup:  Facility:  [facility]  Date of visit: 11/10/22  Reason for visit: Palpitations  Current Status: improved   She has tried the metoprolol its ok         Review of Systems   Constitutional, HEENT, cardiovascular, " "pulmonary, gi and gu systems are negative, except as otherwise noted.      Objective    /82   Pulse 68   Temp 99.1  F (37.3  C) (Tympanic)   Ht 1.676 m (5' 6\")   Wt 81.2 kg (179 lb)   SpO2 100%   BMI 28.89 kg/m    Body mass index is 28.89 kg/m .  Physical Exam   GENERAL: healthy, alert and no distress  RESP: lungs clear to auscultation - no rales, rhonchi or wheezes  CV: regular rate and rhythm, normal S1 S2, no S3 or S4, no murmur, click or rub, no peripheral edema and peripheral pulses strong  MS: RLE exam shows spider veins and palpation of varicose vein posterior calf.     Admission on 11/10/2022, Discharged on 11/10/2022   Component Date Value Ref Range Status     Troponin T, High Sensitivity 11/10/2022 <6  <=14 ng/L Final    Either a High Sensitivity Troponin T baseline (0 hours) value = 100 ng/mL, or an increase in High Sensitivity Troponin T = 7 ng/mL at 2 hours compared to 0 hours (2-0 hours), suggests myocardial injury, and urgent clinical attention is required.    If the 2-0 hours increase is<7 ng/mL, a High Sensitivity Troponin T result above gender-specific reference ranges warrants further evaluation.   Recommendations for further evaluation include correlation with clinical decision-making tool (e.g., HEART), a 3rd High Sensitivity Troponin T test 2 hours after the 2nd (a 20% change from baseline would represent concern), admission for observation, close PCC/cardiology follow-up, or urgent outpatient provocative testing.     TSH 11/10/2022 2.79  0.30 - 4.20 uIU/mL Final     hCG Serum Qualitative 11/10/2022 Negative  Negative Final    This test is for screening purposes.  Results should be interpreted along with the clinical picture.  Confirmation testing is available if warranted by ordering [id number], HCG Quantitative Pregnancy.     Lipase 11/10/2022 41  13 - 60 U/L Final     Sodium 11/10/2022 141  136 - 145 mmol/L Final     Potassium 11/10/2022 3.9  3.4 - 5.3 mmol/L Final     Chloride " 11/10/2022 103  98 - 107 mmol/L Final     Carbon Dioxide (CO2) 11/10/2022 26  22 - 29 mmol/L Final     Anion Gap 11/10/2022 12  7 - 15 mmol/L Final     Urea Nitrogen 11/10/2022 11.0  6.0 - 20.0 mg/dL Final     Creatinine 11/10/2022 0.73  0.51 - 0.95 mg/dL Final     Calcium 11/10/2022 9.7  8.6 - 10.0 mg/dL Final     Glucose 11/10/2022 105 (H)  70 - 99 mg/dL Final     Alkaline Phosphatase 11/10/2022 62  35 - 104 U/L Final     AST 11/10/2022 22  10 - 35 U/L Final     ALT 11/10/2022 16  10 - 35 U/L Final     Protein Total 11/10/2022 7.6  6.4 - 8.3 g/dL Final     Albumin 11/10/2022 4.6  3.5 - 5.2 g/dL Final     Bilirubin Total 11/10/2022 0.4  <=1.2 mg/dL Final     GFR Estimate 11/10/2022 >90  >60 mL/min/1.73m2 Final    Effective December 21, 2021 eGFRcr in adults is calculated using the 2021 CKD-EPI creatinine equation which includes age and gender (Riaz et al., NEJ, DOI: 10.1056/OXPCsi3321342)     D-Dimer Quantitative 11/10/2022 <0.27  0.00 - 0.50 ug/mL FEU Final     Hold Specimen 11/10/2022 JIC   Final       Nicki Ramos M.D.

## 2022-11-18 NOTE — TELEPHONE ENCOUNTER
Patient has been seen in ED and in clinic since this message was taken.  Closing this encounter.  ANÍBAL TerrazasN, RN

## 2022-12-15 ENCOUNTER — VIRTUAL VISIT (OUTPATIENT)
Dept: FAMILY MEDICINE | Facility: CLINIC | Age: 47
End: 2022-12-15
Payer: COMMERCIAL

## 2022-12-15 ENCOUNTER — TELEPHONE (OUTPATIENT)
Dept: FAMILY MEDICINE | Facility: CLINIC | Age: 47
End: 2022-12-15

## 2022-12-15 DIAGNOSIS — U07.1 INFECTION DUE TO 2019 NOVEL CORONAVIRUS: Primary | ICD-10-CM

## 2022-12-15 PROCEDURE — 99214 OFFICE O/P EST MOD 30 MIN: CPT | Mod: 95 | Performed by: FAMILY MEDICINE

## 2022-12-15 RX ORDER — ALBUTEROL SULFATE 90 UG/1
2 AEROSOL, METERED RESPIRATORY (INHALATION) EVERY 6 HOURS PRN
Qty: 18 G | Refills: 1 | Status: SHIPPED | OUTPATIENT
Start: 2022-12-15

## 2022-12-15 NOTE — PROGRESS NOTES
Hannah is a 47 year old who is being evaluated via a billable telephone visit.      What phone number would you like to be contacted at? [phone]  How would you like to obtain your AVS? Javad  Distant Location (provider location):  On-site    Assessment & Plan     Infection due to 2019 novel coronavirus  Patient is higher risk due elevated BMI. Discussed risk vs benefit of paxlovid treatment, patient would like to pursue medication. Reviewed side effects and rug interactions. Recommended to avoid flonase during treatment period and discussed interaction with OCPs. Discussed that if symptoms are worsening she should be evaluated promptly.   - nirmatrelvir and ritonavir (PAXLOVID) therapy pack  Dispense: 30 each; Refill: 0      Return if symptoms worsen or fail to improve.    JUANIOT SANTIAGO [facility]   Hannah is a 47 year old, presenting for the following health issues:  Covid Concern      HPI     COVID-19 Symptom Review  How many days ago did these symptoms start? 3 days.  Monday started with a congested nose and Tuesday sore throat started    Are any of the following symptoms significant for you?  New or worsening difficulty breathing? Yes    Please describe what kind of difficulty you are having breathing:Mild dyspnea (able to do ADLs without difficulty, mild shortness of breath with activities such as climbing one or two flights of stairs or walking briskly)    Worsening cough? Yes, it's a dry cough.     Fever or chills? Yes, I felt feverish or had chills. Temperature has been around 100    Headache: No    Sore throat: YES    Chest pain: No    Diarrhea: No    Body aches? YES    Nasal congestion    What treatments has patient tried? Nonsteroidals, Decongestant - oral and Decongestant - nasal, saline rinses  Does patient live in a nursing home, group home, or shelter? No  Does patient have a way to get food/medications during quarantined? Yes, I have a  friend or family member who can help me.  -Eating and drinking ok.  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   Up to date on COVID vaccines.   BMI 28    Review of Systems   Constitutional, HEENT, cardiovascular, pulmonary, gi and gu systems are negative, except as otherwise noted.      Objective           Vitals:  No vitals were obtained today due to virtual visit.    Physical Exam     RESP: No cough, no audible wheezing, able to talk in full sentences  Remainder of exam unable to be completed due to telephone visits            Phone call duration: 15 minutes

## 2022-12-15 NOTE — TELEPHONE ENCOUNTER
Patient received a paxlovid rx today and was also expecting an inhaler (albuterol) Please advise    Azalia Carlson Pharmacy Tech [facility]   [phone]

## 2022-12-15 NOTE — PATIENT INSTRUCTIONS
COVID-19 Outpatient Treatments  Your care team can help you find the best treatments for COVID-19. Talk to a health care provider or refer to the FDA medicine fact sheets below.    Important: You can't have Paxlovid or molnupiravir if you're starting the medicine more than 5 days after your symptoms have started.  Paxlovid: https://www.fda.gov/media/747899/download  Molnupiravir (Lagevrio): https://www.fda.gov/media/519179/download  Paxlovid (nimatrelvir and ritonavir)  How it works  Two medicines (nirmatrelvir and ritonavir) are taken together. They stop the virus from growing. Less amount of virus is easier for your body to fight.  Benefits  Lowers risk of a hospital stay or death from COVID-19.  How to take    Medicine comes in a daily container with both medicine tablets. Take by mouth twice daily (once in the morning, once at night) for 5 days.    The number of tablets to take varies by patient.    Don't chew or break capsules. Swallow whole.  When to take  Take as soon as possible after positive COVID-19 test result, and within 5 days of your first symptoms.  Who can take it  Patients must be 12 years or older, weigh at least 88 pounds, and have tested positive for COVID-19. Paxlovid is the preferred treatment for pregnant patients.  Possible side effects  Can cause altered sense of taste, diarrhea (loose, watery stools), high blood pressure, muscle aches.  Medicine conflicts    Some medicines may conflict with Paxlovid and may cause serious side effects.    Tell your care team about all the medicines you take, including prescription and over-the-counter medicines, vitamins, and herbal supplements.    Your care team will review your medicines to make sure that you can safely take Paxlovid.  Cautions    Paxlovid is not advised for patients with severe kidney or liver disease. If you have kidney or liver problems, the dose may need to be changed.    If you're pregnant or breastfeeding, talk to your care team  about your options.    If you take hormonal birth control (such as the Pill), then you or your partner should also use a non-hormonal form of birth control (such as a condom). Keep doing this for 1 menstrual cycle after your last dose of Paxlovid.  Molnupiravir (Lagevrio)  How it works  Stops the virus from growing. Less amount of virus is easier for your body to fight.  Benefits  Lowers risk of a hospital stay or death from COVID-19.  How to take  Take 4 capsules by mouth every 12 hours (4 in the morning and 4 at night) for 5 days. Don't chew or break capsules. Swallow whole.  When to take  Take as soon as possible after positive COVID-19 test result, and within 5 days of your first symptoms.  Who can take it  Patients must be 18 years or older and have tested positive for COVID-19.  Possible side effects  Diarrhea (loose, watery stools), nausea (feeling sick to your stomach), dizziness, headaches.  Medicine conflicts  Right now, there are no known conflicts with other drugs. But tell your care team about all medicines you take.  Cautions    This medicine is not advised for patients who are pregnant.    If you are someone who could become pregnant, use trusted birth control until 4 days after your last dose of molnupiravir.    If your partner could become pregnant, you should use trusted birth control until 3 months after your last dose of molnupiravir.  For informational purposes only. Not to replace the advice of your health care provider. Copyright   2022 [facility]. All rights reserved. Clinically reviewed by Ana William, PharmD, BCACP. [employer]enios [id number] - REV 12/22.

## 2022-12-26 DIAGNOSIS — R00.2 PALPITATIONS: Primary | ICD-10-CM

## 2022-12-26 RX ORDER — METOPROLOL TARTRATE 25 MG/1
25 TABLET, FILM COATED ORAL 2 TIMES DAILY PRN
Qty: 180 TABLET | Refills: 1 | Status: SHIPPED | OUTPATIENT
Start: 2022-12-26 | End: 2024-06-03

## 2022-12-26 NOTE — TELEPHONE ENCOUNTER
"Requested Prescriptions   Pending Prescriptions Disp Refills     metoprolol tartrate (LOPRESSOR) 25 MG tablet [Pharmacy Med Name: METOPROLOL TARTRATE 25MG TABS] 60 tablet 0     Sig: TAKE 1 TABLET (25 MG) BY MOUTH 2 TIMES DAILY AS NEEDED (PALPITATIONS)       Beta-Blockers Protocol Failed - 12/26/2022 10:48 AM        Failed - Recent (12 mo) or future (30 days) visit within the authorizing provider's specialty     Patient has had an office visit with the authorizing provider or a provider within the authorizing providers department within the previous 12 mos or has a future within next 30 days. See \"Patient Info\" tab in inbasket, or \"Choose Columns\" in Meds & Orders section of the refill encounter.              Passed - Blood pressure under 140/90 in past 12 months     BP Readings from Last 3 Encounters:   11/17/22 122/82   11/10/22 (!) 155/102   05/04/22 126/82                 Passed - Patient is age 6 or older        Passed - Medication is active on med list           "

## 2023-03-07 ENCOUNTER — TELEPHONE (OUTPATIENT)
Dept: FAMILY MEDICINE | Facility: CLINIC | Age: 48
End: 2023-03-07
Payer: COMMERCIAL

## 2023-03-07 DIAGNOSIS — Z12.11 COLON CANCER SCREENING: Primary | ICD-10-CM

## 2023-03-07 NOTE — TELEPHONE ENCOUNTER
Order/Referral Request    Who is requesting: patient    Orders being requested: Colonoscopy    Reason service is needed/diagnosis: Age 48    When are orders needed by: asap    Has this been discussed with Provider: Yes    Does patient have a preference on a Group/Provider/Facility? [facility]    Does patient have an appointment scheduled?: No    Where to send orders: Place orders within Epic    Could we send this information to you in [facility] or would you prefer to receive a phone call?:   Patient would prefer a phone call   Okay to leave a detailed message?: Yes at Home number on file [phone] (home)

## 2023-03-07 NOTE — TELEPHONE ENCOUNTER
Patient called back and says she sees OB for her preventive care and is trying to be proactive and get an colonoscopy on the book and her OB appt is not until may.      Sariah Lyons, DAYANA Chapin

## 2023-03-07 NOTE — TELEPHONE ENCOUNTER
Call placed to Patient  No answer  Left message and call back number for Patient to return call  Bill Callaway RN

## 2023-03-14 NOTE — TELEPHONE ENCOUNTER
Pt wanting a colonoscopy. Pt needs a rererral she just had a preventative through OBGYN. She just wants a routine colonoscopy and would like a referral.     .Kelly Easley PSC

## 2023-03-21 ENCOUNTER — TELEPHONE (OUTPATIENT)
Dept: SURGERY | Facility: CLINIC | Age: 48
End: 2023-03-21
Payer: COMMERCIAL

## 2023-03-21 NOTE — TELEPHONE ENCOUNTER
Screening Questions  BLUE  KIND OF PREP RED  LOCATION [review exclusion criteria] GREEN  SEDATION TYPE        y Are you active on mychart?       [place] Ordering/Referring Provider?        [facility] What type of coverage do you have?      N Have you had a positive covid test in the last 14 days?     28.89 1. BMI  [BMI 40+ - review exclusion criteria]    Y  2. Are you able to give consent for your medical care? [IF NO,RN REVIEW]          N  3. Are you taking any prescription pain medications on a routine schedule   (ex narcotics: oxycodone, roxicodone, oxycontin,  and percocet)? [RN Review]        N  3a. EXTENDED PREP What kind of prescription?     N 4. Do you have any chemical dependencies such as alcohol, street drugs, or methadone?        **If yes 3- 5 , please schedule with MAC sedation.**          IF YES TO ANY 6 - 10 - HOSPITAL SETTING ONLY.     N 6.   Do you need assistance transferring?     N 7.   Have you had a heart or lung transplant?    N 8.   Are you currently on dialysis?   N 9.   Do you use daily home oxygen?   N 10. Do you take nitroglycerin?   10a. N If yes, how often?     11. [FEMALES]  N Are you currently pregnant?    11a. N If yes, how many weeks? [ Greater than 12 weeks, OR NEEDED]    N 12. Do you have Pulmonary Hypertension? *NEED PAC APPT AT [facility] w/ MAC*     N 13. [review exclusion criteria]  Do you have any implantable devices in your body (pacemaker, defib, LVAD)?    N 14. In the past 6 months, have you had any heart related issues including cardiomyopathy or heart attack?     14a. N If yes, did it require cardiac stenting if so when?     N 15. Have you had a stroke or Transient ischemic attack (TIA - aka  mini stroke ) within 6 months?      N 16. Do you have mod to severe Obstructive Sleep Apnea?  [Hospital only]    N 17. Do you have SEVERE AND UNCONTROLLED asthma? *NEED PAC APPT AT [facility] w/MAC*     18. Are you currently taking any blood thinners?     18a. No. Continue to  "19.   18b. Yes/no Blood Thinner: No [CONTINUE TO #19]    N 19. Do you take the medication Phentermine?    19a. If yes, \"Hold for 7 days before procedure.  Please consult your prescribing provider if you have questions about holding this medication.\"     N  20. Do you have chronic kidney disease?      N  21. Do you have a diagnosis of diabetes?     N  22. On a regular basis do you go 3-5 days between bowel movements?     See below 23. Preferred LOCAL Pharmacy for Pre Prescription    [ LIST ONLY ONE PHARMACY]        [facility] [place], [place] - [address] [facility] [address]      - CLOSING REMINDERS -    Informed patient they will need an adult    Cannot take any type of public or medical transportation alone    Conscious Sedation- Needs  for 6 hours after the procedure       MAC/General-Needs  for 24 hours after procedure    Pre-Procedure Covid test to be completed [[facility] PCR Testing Required]    Confirmed Nurse will call to complete assessment       - SCHEDULING DETAILS -  N Hospital Setting Required? If yes, what is the exclusion?: LITA Rob  Surgeon    6-26-23  Date of Procedure  Lower Endoscopy [Colonoscopy]  Type of Procedure Scheduled  [facility]-If you answer yes to questions #8, #20, #21Which Colonoscopy Prep was Sent?     GEN Sedation Type     N PAC / Pre-op Required                 "

## 2023-04-15 ENCOUNTER — HEALTH MAINTENANCE LETTER (OUTPATIENT)
Age: 48
End: 2023-04-15

## 2023-05-15 ENCOUNTER — LAB REQUISITION (OUTPATIENT)
Dept: LAB | Facility: CLINIC | Age: 48
End: 2023-05-15

## 2023-05-15 DIAGNOSIS — Z13.1 ENCOUNTER FOR SCREENING FOR DIABETES MELLITUS: ICD-10-CM

## 2023-05-15 DIAGNOSIS — Z13.29 ENCOUNTER FOR SCREENING FOR OTHER SUSPECTED ENDOCRINE DISORDER: ICD-10-CM

## 2023-05-15 DIAGNOSIS — Z13.220 ENCOUNTER FOR SCREENING FOR LIPOID DISORDERS: ICD-10-CM

## 2023-05-15 DIAGNOSIS — Z12.4 ENCOUNTER FOR SCREENING FOR MALIGNANT NEOPLASM OF CERVIX: ICD-10-CM

## 2023-05-15 DIAGNOSIS — I49.3 VENTRICULAR PREMATURE DEPOLARIZATION: ICD-10-CM

## 2023-05-15 PROCEDURE — 83036 HEMOGLOBIN GLYCOSYLATED A1C: CPT | Performed by: OBSTETRICS & GYNECOLOGY

## 2023-05-15 PROCEDURE — 87624 HPV HI-RISK TYP POOLED RSLT: CPT | Performed by: OBSTETRICS & GYNECOLOGY

## 2023-05-15 PROCEDURE — 80061 LIPID PANEL: CPT | Performed by: OBSTETRICS & GYNECOLOGY

## 2023-05-15 PROCEDURE — G0145 SCR C/V CYTO,THINLAYER,RESCR: HCPCS | Performed by: OBSTETRICS & GYNECOLOGY

## 2023-05-15 PROCEDURE — 80053 COMPREHEN METABOLIC PANEL: CPT | Performed by: OBSTETRICS & GYNECOLOGY

## 2023-05-15 PROCEDURE — 84443 ASSAY THYROID STIM HORMONE: CPT | Performed by: OBSTETRICS & GYNECOLOGY

## 2023-05-16 LAB
ALBUMIN SERPL BCG-MCNC: 4.5 G/DL (ref 3.5–5.2)
ALP SERPL-CCNC: 57 U/L (ref 35–104)
ALT SERPL W P-5'-P-CCNC: 17 U/L (ref 10–35)
ANION GAP SERPL CALCULATED.3IONS-SCNC: 12 MMOL/L (ref 7–15)
AST SERPL W P-5'-P-CCNC: 27 U/L (ref 10–35)
BILIRUB SERPL-MCNC: 0.6 MG/DL
BUN SERPL-MCNC: 12.1 MG/DL (ref 6–20)
CALCIUM SERPL-MCNC: 9.2 MG/DL (ref 8.6–10)
CHLORIDE SERPL-SCNC: 103 MMOL/L (ref 98–107)
CHOLEST SERPL-MCNC: 159 MG/DL
CREAT SERPL-MCNC: 0.89 MG/DL (ref 0.51–0.95)
DEPRECATED HCO3 PLAS-SCNC: 22 MMOL/L (ref 22–29)
GFR SERPL CREATININE-BSD FRML MDRD: 79 ML/MIN/1.73M2
GLUCOSE SERPL-MCNC: 100 MG/DL (ref 70–99)
HBA1C MFR BLD: 5.5 %
HDLC SERPL-MCNC: 79 MG/DL
LDLC SERPL CALC-MCNC: 61 MG/DL
NONHDLC SERPL-MCNC: 80 MG/DL
POTASSIUM SERPL-SCNC: 4 MMOL/L (ref 3.4–5.3)
PROT SERPL-MCNC: 7.5 G/DL (ref 6.4–8.3)
SODIUM SERPL-SCNC: 137 MMOL/L (ref 136–145)
TRIGL SERPL-MCNC: 95 MG/DL
TSH SERPL DL<=0.005 MIU/L-ACNC: 2.94 UIU/ML (ref 0.3–4.2)

## 2023-05-17 LAB
BKR LAB AP GYN ADEQUACY: NORMAL
BKR LAB AP GYN INTERPRETATION: NORMAL
BKR LAB AP HPV REFLEX: NORMAL
BKR LAB AP LMP: NORMAL
BKR LAB AP PREVIOUS ABNL DX: NORMAL
BKR LAB AP PREVIOUS ABNORMAL: NORMAL
PATH REPORT.COMMENTS IMP SPEC: NORMAL
PATH REPORT.COMMENTS IMP SPEC: NORMAL
PATH REPORT.RELEVANT HX SPEC: NORMAL

## 2023-05-18 LAB
HUMAN PAPILLOMA VIRUS 16 DNA: NEGATIVE
HUMAN PAPILLOMA VIRUS 18 DNA: NEGATIVE
HUMAN PAPILLOMA VIRUS FINAL DIAGNOSIS: NORMAL
HUMAN PAPILLOMA VIRUS OTHER HR: NEGATIVE

## 2023-05-22 NOTE — TELEPHONE ENCOUNTER
Pt is overdue for preventative visit.  I would recommend she schedule preventative visit and all her preventative needs can be addressed there    Dr. Holly Somers, DO     Nsaids Pregnancy And Lactation Text: These medications are considered safe up to 30 weeks gestation. It is excreted in breast milk.

## 2023-06-16 RX ORDER — BISACODYL 5 MG/1
TABLET, DELAYED RELEASE ORAL
Qty: 4 TABLET | Refills: 0 | Status: SHIPPED | OUTPATIENT
Start: 2023-06-16 | End: 2023-06-26

## 2023-06-23 ENCOUNTER — ANESTHESIA EVENT (OUTPATIENT)
Dept: GASTROENTEROLOGY | Facility: CLINIC | Age: 48
End: 2023-06-23
Payer: COMMERCIAL

## 2023-06-23 NOTE — ANESTHESIA PREPROCEDURE EVALUATION
Anesthesia Pre-Procedure Evaluation    Patient: Hannah Perdomo   MRN: [id number] : 1975        Procedure : Procedure(s):  Colonoscopy          Past Medical History:   Diagnosis Date     Palpitations       Past Surgical History:   Procedure Laterality Date     HC RECONSTR NOSE+ASPEN SEPTAL REPAIR       ZZC APPENDECTOMY        Allergies   Allergen Reactions     Pcn [Penicillins]      When was a baby     Sulfa Antibiotics      rash      Social History     Tobacco Use     Smoking status: Never     Smokeless tobacco: Never   Substance Use Topics     Alcohol use: Yes     Comment: 3 drinks per week      Wt Readings from Last 1 Encounters:   22 81.2 kg (179 lb)        Anesthesia Evaluation   Pt has had prior anesthetic. Type: General.        ROS/MED HX  ENT/Pulmonary:  - neg pulmonary ROS     Neurologic:  - neg neurologic ROS     Cardiovascular: Comment: 16 Echo  Interpretation Summary     The left ventricle is normal in size. There is normal left ventricular wall  thickness. Left ventricular systolic function is normal. The visual ejection  fraction is estimated at 55-60%. Normal left ventricular diastolic function.  No regional wall motion abnormalities noted.  The right ventricle is normal size. The right ventricular systolic function  is normal.  There is trace to mild mitral regurgitation.  There is mild (1+) tricuspid regurgitation  There is no pericardial effusion.  No previous study for comparison.  ______________________________________________________________________________           Left Ventricle  The left ventricle is normal in size. There is normal left ventricular wall  thickness. Left ventricular systolic function is normal. The visual ejection  fraction is estimated at 55-60%. Normal left ventricular diastolic function.  No regional wall motion abnormalities noted.     Right Ventricle  The right ventricle is normal size. The right ventricular systolic function  is normal.  Atria  Normal  left atrial size. Right atrial size is normal.     Mitral Valve  The mitral valve leaflets are mildly thickened. There is trace to mild mitral  regurgitation.     Tricuspid Valve  There is mild (1+) tricuspid regurgitation. The right ventricular systolic  pressure is approximated at 31.8 mmHg plus the right atrial pressure.     Aortic Valve  There is mild trileaflet aortic sclerosis. No aortic regurgitation is  present. No aortic stenosis is present.     Pulmonic Valve  There is no pulmonic valvular stenosis.     Vessels  The aortic root is normal size. Descending aortic velocity normal. The IVC is  normal in size and reactivity with respiration, suggesting normal central  venous pressure.  Pericardium   - neg cardiovascular ROS     METS/Exercise Tolerance:     Hematologic:  - neg hematologic  ROS     Musculoskeletal:  - neg musculoskeletal ROS     GI/Hepatic:  - neg GI/hepatic ROS     Renal/Genitourinary:  - neg Renal ROS     Endo:  - neg endo ROS     Psychiatric/Substance Use:  - neg psychiatric ROS     Infectious Disease:  - neg infectious disease ROS     Malignancy:  - neg malignancy ROS     Other:  - neg other ROS          Physical Exam    Airway        Mallampati: I   TM distance: > 3 FB   Neck ROM: full   Mouth opening: > 3 cm    Respiratory Devices and Support         Dental       (+) Modest Abnormalities - crowns, retainers, 1 or 2 missing teeth      Cardiovascular   cardiovascular exam normal          Pulmonary                   OUTSIDE LABS:  CBC:   Lab Results   Component Value Date    WBC 8.7 07/13/2016    WBC 8.6 06/11/2016    HGB 13.3 07/13/2016    HGB 13.3 06/11/2016    HCT 40.5 07/13/2016    HCT 40.2 06/11/2016     07/13/2016     06/11/2016     BMP:   Lab Results   Component Value Date     05/15/2023     11/10/2022    POTASSIUM 4.0 05/15/2023    POTASSIUM 3.9 11/10/2022    CHLORIDE 103 05/15/2023    CHLORIDE 103 11/10/2022    CO2 22 05/15/2023    CO2 26 11/10/2022    BUN  12.1 05/15/2023    BUN 11.0 11/10/2022    CR 0.89 05/15/2023    CR 0.73 11/10/2022     (H) 05/15/2023     (H) 11/10/2022     COAGS: No results found for: PTT, INR, FIBR  POC:   Lab Results   Component Value Date    HCG  06/22/2010     Negative   This test provides a presumptive diagnosis of pregnancy or non-pregnancy. A   confirmed pregnancy diagnosis should only be made by a physician after all   clinical and laboratory findings have been evaluated.    HCGS Negative 11/10/2022     HEPATIC:   Lab Results   Component Value Date    ALBUMIN 4.5 05/15/2023    PROTTOTAL 7.5 05/15/2023    ALT 17 05/15/2023    AST 27 05/15/2023    ALKPHOS 57 05/15/2023    BILITOTAL 0.6 05/15/2023     OTHER:   Lab Results   Component Value Date    A1C 5.5 05/15/2023    MICHAEL 9.2 05/15/2023    LIPASE 41 11/10/2022    TSH 2.94 05/15/2023       Anesthesia Plan    ASA Status:  2      Anesthesia Type: General.              Consents    Anesthesia Plan(s) and associated risks, benefits, and realistic alternatives discussed. Questions answered and patient/representative(s) expressed understanding.     - Discussed: Risks, Benefits and Alternatives for BOTH SEDATION and the PROCEDURE were discussed     - Discussed with:  Patient         Postoperative Care       PONV prophylaxis: Background Propofol Infusion     Comments:                BRENT Cam CRNA

## 2023-06-26 ENCOUNTER — HOSPITAL ENCOUNTER (OUTPATIENT)
Facility: CLINIC | Age: 48
Discharge: HOME | End: 2023-06-26
Attending: SURGERY | Admitting: SURGERY
Payer: COMMERCIAL

## 2023-06-26 ENCOUNTER — ANESTHESIA (OUTPATIENT)
Dept: GASTROENTEROLOGY | Facility: CLINIC | Age: 48
End: 2023-06-26
Payer: COMMERCIAL

## 2023-06-26 VITALS
DIASTOLIC BLOOD PRESSURE: 73 MMHG | SYSTOLIC BLOOD PRESSURE: 120 MMHG | WEIGHT: 179 LBS | OXYGEN SATURATION: 99 % | HEIGHT: 66 IN | RESPIRATION RATE: 16 BRPM | HEART RATE: 60 BPM | TEMPERATURE: 98 F | BODY MASS INDEX: 28.77 KG/M2

## 2023-06-26 DIAGNOSIS — Z12.11 SPECIAL SCREENING FOR MALIGNANT NEOPLASMS, COLON: Primary | ICD-10-CM

## 2023-06-26 LAB — COLONOSCOPY: NORMAL

## 2023-06-26 PROCEDURE — G0121 COLON CA SCRN NOT HI RSK IND: HCPCS | Performed by: SURGERY

## 2023-06-26 PROCEDURE — 45378 DIAGNOSTIC COLONOSCOPY: CPT | Performed by: SURGERY

## 2023-06-26 PROCEDURE — 370N000017 HC ANESTHESIA TECHNICAL FEE, PER MIN: Performed by: SURGERY

## 2023-06-26 PROCEDURE — 258N000003 HC RX IP 258 OP 636: Performed by: NURSE ANESTHETIST, CERTIFIED REGISTERED

## 2023-06-26 RX ORDER — SODIUM CHLORIDE, SODIUM LACTATE, POTASSIUM CHLORIDE, CALCIUM CHLORIDE 600; 310; 30; 20 MG/100ML; MG/100ML; MG/100ML; MG/100ML
INJECTION, SOLUTION INTRAVENOUS CONTINUOUS
Status: DISCONTINUED | OUTPATIENT
Start: 2023-06-26 | End: 2023-06-26 | Stop reason: HOSPADM

## 2023-06-26 RX ORDER — ONDANSETRON 2 MG/ML
4 INJECTION INTRAMUSCULAR; INTRAVENOUS
Status: DISCONTINUED | OUTPATIENT
Start: 2023-06-26 | End: 2023-06-26 | Stop reason: HOSPADM

## 2023-06-26 RX ORDER — LIDOCAINE 40 MG/G
CREAM TOPICAL
Status: DISCONTINUED | OUTPATIENT
Start: 2023-06-26 | End: 2023-06-26 | Stop reason: HOSPADM

## 2023-06-26 RX ORDER — NALOXONE HYDROCHLORIDE 0.4 MG/ML
0.2 INJECTION, SOLUTION INTRAMUSCULAR; INTRAVENOUS; SUBCUTANEOUS
Status: CANCELLED | OUTPATIENT
Start: 2023-06-26

## 2023-06-26 RX ORDER — OXYCODONE HYDROCHLORIDE 5 MG/1
5 TABLET ORAL
Status: DISCONTINUED | OUTPATIENT
Start: 2023-06-26 | End: 2023-06-26 | Stop reason: HOSPADM

## 2023-06-26 RX ORDER — ONDANSETRON 2 MG/ML
4 INJECTION INTRAMUSCULAR; INTRAVENOUS EVERY 30 MIN PRN
Status: DISCONTINUED | OUTPATIENT
Start: 2023-06-26 | End: 2023-06-26 | Stop reason: HOSPADM

## 2023-06-26 RX ORDER — NALOXONE HYDROCHLORIDE 0.4 MG/ML
0.4 INJECTION, SOLUTION INTRAMUSCULAR; INTRAVENOUS; SUBCUTANEOUS
Status: CANCELLED | OUTPATIENT
Start: 2023-06-26

## 2023-06-26 RX ORDER — FLUMAZENIL 0.1 MG/ML
0.2 INJECTION, SOLUTION INTRAVENOUS
Status: CANCELLED | OUTPATIENT
Start: 2023-06-26 | End: 2023-06-26

## 2023-06-26 RX ORDER — ONDANSETRON 4 MG/1
4 TABLET, ORALLY DISINTEGRATING ORAL EVERY 30 MIN PRN
Status: DISCONTINUED | OUTPATIENT
Start: 2023-06-26 | End: 2023-06-26 | Stop reason: HOSPADM

## 2023-06-26 RX ORDER — OXYCODONE HYDROCHLORIDE 5 MG/1
10 TABLET ORAL
Status: DISCONTINUED | OUTPATIENT
Start: 2023-06-26 | End: 2023-06-26 | Stop reason: HOSPADM

## 2023-06-26 RX ADMIN — SODIUM CHLORIDE, POTASSIUM CHLORIDE, SODIUM LACTATE AND CALCIUM CHLORIDE: 600; 310; 30; 20 INJECTION, SOLUTION INTRAVENOUS at 07:40

## 2023-06-26 ASSESSMENT — ACTIVITIES OF DAILY LIVING (ADL)
ADLS_ACUITY_SCORE: 35
ADLS_ACUITY_SCORE: 35

## 2023-06-26 NOTE — ANESTHESIA CARE TRANSFER NOTE
Patient: Hannah Perdomo    Procedure: Procedure(s):  Colonoscopy       Diagnosis: Colon cancer screening [Z12.11]  Diagnosis Additional Information: No value filed.    Anesthesia Type:   General     Note:    Oropharynx: oropharynx clear of all foreign objects and spontaneously breathing  Level of Consciousness: awake  Oxygen Supplementation: room air    Independent Airway: airway patency satisfactory and stable  Dentition: dentition unchanged  Vital Signs Stable: post-procedure vital signs reviewed and stable  Report to RN Given: handoff report given  Patient transferred to: Phase II    Handoff Report: Identifed the Patient, Identified the Reponsible Provider, Reviewed the pertinent medical history, Discussed the surgical course, Reviewed Intra-OP anesthesia mangement and issues during anesthesia, Set expectations for post-procedure period and Allowed opportunity for questions and acknowledgement of understanding      Vitals:  Vitals Value Taken Time   BP     Temp     Pulse     Resp     SpO2         Electronically Signed By: BRENT Wahl CRNA  June 26, 2023  8:23 AM

## 2023-06-26 NOTE — ANESTHESIA POSTPROCEDURE EVALUATION
Patient: Hannah Perdomo    Procedure: Procedure(s):  Colonoscopy       Anesthesia Type:  General    Note:  Disposition: Outpatient   Postop Pain Control: Uneventful            Sign Out: Well controlled pain   PONV: No   Neuro/Psych: Uneventful            Sign Out: Acceptable/Baseline neuro status   Airway/Respiratory: Uneventful            Sign Out: Acceptable/Baseline resp. status   CV/Hemodynamics: Uneventful            Sign Out: Acceptable CV status; No obvious hypovolemia; No obvious fluid overload   Other NRE: NONE   DID A NON-ROUTINE EVENT OCCUR? No           Last vitals:  Vitals:    06/26/23 0714   BP: 124/77   Pulse: 72   Temp: 36.5  C (97.7  F)   SpO2: 99%       Electronically Signed By: BRENT Wahl CRNA  June 26, 2023  8:45 AM

## 2023-06-26 NOTE — H&P
"48 year old year old female here for colonoscopy for screening.  Last colonoscopy was in her 20's and negative.    Patient denies blood in stool or change in stool caliber.  There is no known family history of colon cancer or polyps.    Patient Active Problem List   Diagnosis     CARDIOVASCULAR SCREENING; LDL GOAL LESS THAN 160     Health Care Home       Past Medical History:   Diagnosis Date     Palpitations        Past Surgical History:   Procedure Laterality Date     HC RECONSTR NOSE+ASPEN SEPTAL REPAIR       ZZC APPENDECTOMY         Family History   Problem Relation Age of Onset     Diabetes Father      Cerebrovascular Disease Father      Parkinsonism Mother      Cerebrovascular Disease Mother        Current Outpatient Rx   Medication Sig Dispense Refill     bisacodyl (DULCOLAX) 5 MG EC tablet Take 2 tablets at 3 pm the day before your procedure. If your procedure is before 11 am, take 2 additional tablets at 11 pm. If your procedure is after 11 am, take 2 additional tablets at 6 am. For additional instructions refer to your colonoscopy prep instructions. 4 tablet 0     polyethylene glycol (GOLYTELY) 236 g suspension The night before the exam at 6 pm drink an 8-ounce glass every 15 minutes until the jug is half empty. If you arrive before 11 AM: Drink the other half of the Golytely jug at 11 PM night before procedure. If you arrive after 11 AM: Drink the other half of the Golytely jug at 6 AM day of procedure. For additional instructions refer to your colonoscopy prep instructions. 4000 mL 0       Allergies   Allergen Reactions     Pcn [Penicillins]      When was a baby     Sulfa Antibiotics      rash       Pt reports that she has never smoked. She has never used smokeless tobacco. She reports current alcohol use. She reports that she does not use drugs.    Exam:  /77 (BP Location: Right arm)   Pulse 72   Temp 97.7  F (36.5  C) (Oral)   Ht 1.676 m (5' 6\")   Wt 81.2 kg (179 lb)   SpO2 99%   BMI 28.89 " kg/m      Awake, Alert OX3  Lungs - CTA bilaterally  CV - RRR, no murmurs, distal pulses intact  Abd - soft, non-distended, non-tender, +BS  Extr - No cyanosis or edema    A/P 48 year old year old female in need of colonoscopy for screening. Risks, benefits, alternatives, and complications were discussed including the possibility of perforation, bleeding, missed lesion and the patient agreed to proceed    Samson Rob DO on 6/26/2023 at 7:33 AM

## 2023-07-18 ENCOUNTER — TELEPHONE (OUTPATIENT)
Dept: FAMILY MEDICINE | Facility: CLINIC | Age: 48
End: 2023-07-18
Payer: COMMERCIAL

## 2023-07-18 DIAGNOSIS — Z12.39 SCREENING FOR BREAST CANCER: Primary | ICD-10-CM

## 2023-07-18 NOTE — TELEPHONE ENCOUNTER
Patient Quality Outreach    Patient is due for the following:   Breast Cancer Screening - Mammogram  Depression screen      Topic Date Due     Pneumococcal Vaccine (2 - PCV) 12/02/2003       Next Steps:   needs to be scheduled for mammogram    Type of outreach:    Phone, spoke to patient/parent. usually does mammograms once every couple years. Might call and get it scheduled.      Questions for provider review:    None           Amparo Rolon  Chart routed to self.

## 2023-09-16 ENCOUNTER — HEALTH MAINTENANCE LETTER (OUTPATIENT)
Age: 48
End: 2023-09-16

## 2023-11-30 ENCOUNTER — OFFICE VISIT (OUTPATIENT)
Dept: FAMILY MEDICINE | Facility: CLINIC | Age: 48
End: 2023-11-30
Payer: COMMERCIAL

## 2023-11-30 ENCOUNTER — ANCILLARY PROCEDURE (OUTPATIENT)
Dept: GENERAL RADIOLOGY | Facility: CLINIC | Age: 48
End: 2023-11-30
Attending: FAMILY MEDICINE
Payer: COMMERCIAL

## 2023-11-30 VITALS
RESPIRATION RATE: 14 BRPM | TEMPERATURE: 97.9 F | WEIGHT: 185 LBS | SYSTOLIC BLOOD PRESSURE: 112 MMHG | BODY MASS INDEX: 29.73 KG/M2 | DIASTOLIC BLOOD PRESSURE: 74 MMHG | HEIGHT: 66 IN | HEART RATE: 70 BPM | OXYGEN SATURATION: 99 %

## 2023-11-30 DIAGNOSIS — R10.32 LLQ ABDOMINAL PAIN: Primary | ICD-10-CM

## 2023-11-30 DIAGNOSIS — R10.32 LLQ ABDOMINAL PAIN: ICD-10-CM

## 2023-11-30 PROCEDURE — 74019 RADEX ABDOMEN 2 VIEWS: CPT | Mod: TC | Performed by: RADIOLOGY

## 2023-11-30 PROCEDURE — 99214 OFFICE O/P EST MOD 30 MIN: CPT | Performed by: FAMILY MEDICINE

## 2023-11-30 ASSESSMENT — PAIN SCALES - GENERAL: PAINLEVEL: SEVERE PAIN (7)

## 2023-11-30 NOTE — PROGRESS NOTES
"  Assessment & Plan     LLQ abdominal pain  Xray reassuring will have her get pelvic us . She does not have typical gi symptoms. Us did not show left ovary . Will have physical therapy cont to monitor . Consider ct if not improving   - XR Abdomen 2 Views; Future  - US Pelvic Complete with Transvaginal; Future       BMI:   Estimated body mass index is 29.86 kg/m  as calculated from the following:    Height as of this encounter: 1.676 m (5' 6\").    Weight as of this encounter: 83.9 kg (185 lb).       Get us Patient instructed to return to the office in 2 weeks  for reevaluation unless improving. Signs and symptoms of worsening are reviewed with the patient. If symptoms acutely change and condition worsens patient is instructed to seek medical evaluation through the office or urgent care department or if during non business hours through the emergency department.      Nicki Ramos MD  [facility] LYNDSAY Young is a 48 year old, presenting for the following health issues:  Abdominal Pain        11/30/2023     3:14 PM   Additional Questions   Roomed by Tabatha DU MA   Accompanied by Self       History of Present Illness       Reason for visit:  Pain on lower left side  Symptom onset:  More than a month  Symptoms include:  Pain  Symptom intensity:  Moderate  Symptom progression:  Staying the same  Had these symptoms before:  No  What makes it worse:  No  What makes it better:  No    She eats 2-3 servings of fruits and vegetables daily.She consumes 0 sweetened beverage(s) daily.She exercises with enough effort to increase her heart rate 20 to 29 minutes per day.  She exercises with enough effort to increase her heart rate 6 days per week.   She is taking medications regularly.         Pain History:    Where in your body do you have pain? Abdominal/Flank Pain  Onset/Duration: Couple Months  Description:   Character: Sharp and Gnawing-pain feels deep inside  Location: left lower " "quadrant  Radiation: None, usually localized to LLQ  Intensity: severe, 7/10  Progression of Symptoms:  waxing and waning.  Can't pinpoint when it starts to get better and when it starts to become worse.  Thought maybe it was happening around her cycles, but isn't  Accompanying Signs & Symptoms:  Fever/Chills: No  Gas/Bloating: YES- bloating once in a while, doesn't think it is related  Nausea: No  Vomitting: No  Diarrhea: No  Constipation: No  Dysuria or Hematuria: No  History:   Trauma: No  Previous similar pain: No  Previous tests done: none  Precipitating factors:   Does the pain change with:     Food: No    Bowel Movement: No    Urination: No   Other factors:  No  Therapies tried and outcome: None  Patient's last menstrual period was 11/02/2023.  Has been a constant deep discomfort for the last few night , has had ice ibuprofen no association with eating or bowel movments. No specific activity or position makes this works. No nausea, vomiting or diarrhea, no hematemesis or melena   No fever or chills no urinary sx no vaginal issues   Has not had this before         Review of Systems   negative except as above       Objective    /74 (BP Location: Right arm, Patient Position: Chair, Cuff Size: Adult Large)   Pulse 70   Temp 97.9  F (36.6  C)   Resp 14   Ht 1.676 m (5' 6\")   Wt 83.9 kg (185 lb)   LMP 11/02/2023   SpO2 99%   BMI 29.86 kg/m    Body mass index is 29.86 kg/m .  Physical Exam   GENERAL: healthy, alert and no distress  RESP: lungs clear to auscultation - no rales, rhonchi or wheezes  CV: regular rate and rhythm, normal S1 S2, no S3 or S4, no murmur, click or rub, no peripheral edema and peripheral pulses strong  ABDOMEN: soft, nontender, no hepatosplenomegaly, no masses and bowel sounds normal  ABDOMEN: neg carnettes     Results for orders placed or performed in visit on 11/30/23   XR Abdomen 2 Views     Status: None    Narrative    XR ABDOMEN 2 VIEWS 11/30/2023 4:26 PM    HISTORY: LLQ " abdominal pain    COMPARISON: None.      Impression    IMPRESSION: Bowel gas pattern is nonobstructed. Moderate stool  throughout the colon. No abnormal mass or calcification. No free  intraperitoneal air.    BAY MORRISON MD         SYSTEM ID:  [id number]     US Pelvic Complete with Transvaginal    Result Date: 12/10/2023  EXAM: US PELVIC TRANSABDOMINAL AND TRANSVAGINAL LOCATION: [facility] DATE: 12/9/2023 INDICATION:  LLQ abdominal pain COMPARISON: None. TECHNIQUE: Transabdominal scans were performed. Endovaginal ultrasound was performed to better visualize the adnexa. FINDINGS: UTERUS: 7.1 x 3.7 x 3.1 cm. Normal in size and position with no masses. ENDOMETRIUM: 9 mm. Normal smooth endometrium. RIGHT OVARY: 1.6 x 1.5 x 1.2 cm. Normal in appearance. LEFT OVARY: Not visualized secondary to overlying bowel gas. No significant free fluid.     IMPRESSION: 1.  Left ovary not visualized secondary to overlying bowel gas. 2.  Right ovary and uterus are unremarkable in appearance. 3.  No significant pelvic free fluid.      Nicki Ramos M.D.

## 2023-12-09 ENCOUNTER — HOSPITAL ENCOUNTER (OUTPATIENT)
Dept: ULTRASOUND IMAGING | Facility: CLINIC | Age: 48
Discharge: HOME | End: 2023-12-09
Attending: FAMILY MEDICINE | Admitting: FAMILY MEDICINE
Payer: COMMERCIAL

## 2023-12-09 DIAGNOSIS — R10.32 LLQ ABDOMINAL PAIN: ICD-10-CM

## 2023-12-09 PROCEDURE — 76830 TRANSVAGINAL US NON-OB: CPT

## 2023-12-14 ENCOUNTER — TELEPHONE (OUTPATIENT)
Dept: FAMILY MEDICINE | Facility: CLINIC | Age: 48
End: 2023-12-14
Payer: COMMERCIAL

## 2023-12-14 DIAGNOSIS — R10.32 LLQ ABDOMINAL PAIN: ICD-10-CM

## 2023-12-14 DIAGNOSIS — R10.2 PELVIC PAIN IN FEMALE: Primary | ICD-10-CM

## 2023-12-14 NOTE — TELEPHONE ENCOUNTER
Noted. RN called patient and left a detailed VM with the plan and the number to schedule CT.  Jackelin Velez RN on 12/14/2023 at 3:22 PM

## 2023-12-14 NOTE — TELEPHONE ENCOUNTER
"Patient called wanting to get the next steps after her US she had on Saturday.  [facility]   She stated if felt like the US tech was probing right in the area of her pain. Patient stated it was very uncomfortable to go through. She was extremely sore after.   She is \"bummed\" that they were not able to visualize the left ovary. Patient wondering if a CT would show more.  Still having the lower left abdominal pains.  Pain wakes her up at night and she ices.   RN recommended heat to see if that helps with pain.   Patient stated Dr. Ramos and her discussed the possibility of pain being a hernia.   Patient is still working out.  Patient wants to explore more options to get a definitive dx.   Willing to explore the possibility of it being a hernia or her ovaries.     Routed to Dr. Ramos to advise on the next steps in the plan.   Phone number to call back [phone]. OK to leave a detailed voicemail.  Jackelin Velez RN on 12/14/2023 at 12:08 PM    "

## 2023-12-15 ENCOUNTER — HOSPITAL ENCOUNTER (OUTPATIENT)
Dept: CT IMAGING | Facility: HOSPITAL | Age: 48
Discharge: HOME | End: 2023-12-15
Attending: FAMILY MEDICINE | Admitting: FAMILY MEDICINE
Payer: COMMERCIAL

## 2023-12-15 DIAGNOSIS — R10.2 PELVIC PAIN IN FEMALE: ICD-10-CM

## 2023-12-15 DIAGNOSIS — R10.32 LLQ ABDOMINAL PAIN: ICD-10-CM

## 2023-12-15 PROCEDURE — 74177 CT ABD & PELVIS W/CONTRAST: CPT

## 2023-12-15 PROCEDURE — 250N000011 HC RX IP 250 OP 636: Mod: JZ | Performed by: FAMILY MEDICINE

## 2023-12-15 RX ORDER — IOPAMIDOL 755 MG/ML
90 INJECTION, SOLUTION INTRAVASCULAR ONCE
Status: COMPLETED | OUTPATIENT
Start: 2023-12-15 | End: 2023-12-15

## 2023-12-15 RX ADMIN — IOPAMIDOL 90 ML: 755 INJECTION, SOLUTION INTRAVENOUS at 18:57

## 2024-04-15 ENCOUNTER — PATIENT OUTREACH (OUTPATIENT)
Dept: CARE COORDINATION | Facility: CLINIC | Age: 49
End: 2024-04-15
Payer: COMMERCIAL

## 2024-04-29 ENCOUNTER — PATIENT OUTREACH (OUTPATIENT)
Dept: CARE COORDINATION | Facility: CLINIC | Age: 49
End: 2024-04-29
Payer: COMMERCIAL

## 2024-05-21 ENCOUNTER — PATIENT OUTREACH (OUTPATIENT)
Dept: CARE COORDINATION | Facility: CLINIC | Age: 49
End: 2024-05-21
Payer: COMMERCIAL

## 2024-06-16 ENCOUNTER — HEALTH MAINTENANCE LETTER (OUTPATIENT)
Age: 49
End: 2024-06-16

## 2024-07-15 ENCOUNTER — HOSPITAL ENCOUNTER (OUTPATIENT)
Dept: MAMMOGRAPHY | Facility: CLINIC | Age: 49
Discharge: HOME | End: 2024-07-15
Attending: FAMILY MEDICINE | Admitting: FAMILY MEDICINE
Payer: COMMERCIAL

## 2024-07-15 DIAGNOSIS — Z12.39 SCREENING FOR BREAST CANCER: ICD-10-CM

## 2024-07-15 PROCEDURE — 77063 BREAST TOMOSYNTHESIS BI: CPT

## 2024-10-17 ENCOUNTER — LAB REQUISITION (OUTPATIENT)
Dept: LAB | Facility: CLINIC | Age: 49
End: 2024-10-17

## 2024-10-17 DIAGNOSIS — Z13.220 ENCOUNTER FOR SCREENING FOR LIPOID DISORDERS: ICD-10-CM

## 2024-10-17 DIAGNOSIS — Z13.1 ENCOUNTER FOR SCREENING FOR DIABETES MELLITUS: ICD-10-CM

## 2024-10-17 DIAGNOSIS — Z13.29 ENCOUNTER FOR SCREENING FOR OTHER SUSPECTED ENDOCRINE DISORDER: ICD-10-CM

## 2024-10-17 LAB
CHOLEST SERPL-MCNC: 172 MG/DL
EST. AVERAGE GLUCOSE BLD GHB EST-MCNC: 103 MG/DL
FASTING STATUS PATIENT QL REPORTED: YES
HBA1C MFR BLD: 5.2 %
HDLC SERPL-MCNC: 80 MG/DL
LDLC SERPL CALC-MCNC: 78 MG/DL
NONHDLC SERPL-MCNC: 92 MG/DL
TRIGL SERPL-MCNC: 72 MG/DL
TSH SERPL DL<=0.005 MIU/L-ACNC: 3.04 UIU/ML (ref 0.3–4.2)

## 2024-10-17 PROCEDURE — 83036 HEMOGLOBIN GLYCOSYLATED A1C: CPT | Performed by: OBSTETRICS & GYNECOLOGY

## 2024-10-17 PROCEDURE — 80061 LIPID PANEL: CPT | Performed by: OBSTETRICS & GYNECOLOGY

## 2024-10-17 PROCEDURE — 84443 ASSAY THYROID STIM HORMONE: CPT | Performed by: OBSTETRICS & GYNECOLOGY

## 2024-10-23 ENCOUNTER — PATIENT OUTREACH (OUTPATIENT)
Dept: CARE COORDINATION | Facility: CLINIC | Age: 49
End: 2024-10-23
Payer: COMMERCIAL

## 2024-11-11 ENCOUNTER — PATIENT OUTREACH (OUTPATIENT)
Dept: CARE COORDINATION | Facility: CLINIC | Age: 49
End: 2024-11-11
Payer: COMMERCIAL

## 2025-01-01 ENCOUNTER — HOSPITAL ENCOUNTER (EMERGENCY)
Facility: CLINIC | Age: 50
Discharge: HOME | End: 2025-01-02
Attending: EMERGENCY MEDICINE
Payer: COMMERCIAL

## 2025-01-01 VITALS
HEART RATE: 107 BPM | HEIGHT: 67 IN | RESPIRATION RATE: 18 BRPM | BODY MASS INDEX: 27.47 KG/M2 | OXYGEN SATURATION: 97 % | SYSTOLIC BLOOD PRESSURE: 131 MMHG | WEIGHT: 175 LBS | DIASTOLIC BLOOD PRESSURE: 83 MMHG | TEMPERATURE: 98 F

## 2025-01-01 DIAGNOSIS — S01.01XA SCALP LACERATION, INITIAL ENCOUNTER: ICD-10-CM
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
## 2025-01-01 PROCEDURE — 12001 RPR S/N/AX/GEN/TRNK 2.5CM/<: CPT | Performed by: EMERGENCY MEDICINE

## 2025-01-01 PROCEDURE — 99282 EMERGENCY DEPT VISIT SF MDM: CPT | Performed by: EMERGENCY MEDICINE

## 2025-01-01 ASSESSMENT — COLUMBIA-SUICIDE SEVERITY RATING SCALE - C-SSRS
2. HAVE YOU ACTUALLY HAD ANY THOUGHTS OF KILLING YOURSELF IN THE PAST MONTH?: NO
1. IN THE PAST MONTH, HAVE YOU WISHED YOU WERE DEAD OR WISHED YOU COULD GO TO SLEEP AND NOT WAKE UP?: NO
6. HAVE YOU EVER DONE ANYTHING, STARTED TO DO ANYTHING, OR PREPARED TO DO ANYTHING TO END YOUR LIFE?: NO

## 2025-01-02 NOTE — DISCHARGE INSTRUCTIONS
Follow-up with your clinic providers as needed.    Glue will disappear on its own over the course of 5 to 7 days.

## 2025-01-02 NOTE — ED PROVIDER NOTES
ED Provider Note  [facility]      History     Chief Complaint   Patient presents with    Fall     HPI  Hannah Perdomo is a 49 year old female who presents to the emergency department after the patient had a fall getting out of bed, and subsequently hit the left side of her head against a nightstand.  Injury occurred a short time prior to ED arrival.  Does have laceration and therefore was brought to the emergency department.  No severe headache.  No weakness of the arms, or legs.  Did have a few drinks this evening.  Not on blood thinning medication.        Independent Historian:        Review of External Notes:          Allergies:  Allergies   Allergen Reactions    Pcn [Penicillins]      When was a baby    Sulfa Antibiotics      rash       Problem List:    Patient Active Problem List    Diagnosis Date Noted    CARDIOVASCULAR SCREENING; LDL GOAL LESS THAN 160 10/31/2010     Priority: Medium        Past Medical History:    Past Medical History:   Diagnosis Date    Palpitations        Past Surgical History:    Past Surgical History:   Procedure Laterality Date    COLONOSCOPY N/A 6/26/2023    Procedure: Colonoscopy;  Surgeon: Samson Rob DO;  Location: [facility]    HC RECONSTR NOSE+ASPEN SEPTAL REPAIR      ZZC APPENDECTOMY         Family History:    Family History   Problem Relation Age of Onset    Diabetes Father     Cerebrovascular Disease Father     Parkinsonism Mother     Cerebrovascular Disease Mother        Social History:  Marital Status:   [2]  Social History     Tobacco Use    Smoking status: Never     Passive exposure: Never    Smokeless tobacco: Never   Vaping Use    Vaping status: Never Used   Substance Use Topics    Alcohol use: Yes     Comment: 3 drinks per week    Drug use: No        Medications:    albuterol (PROAIR HFA/PROVENTIL HFA/VENTOLIN HFA) 108 (90 Base) MCG/ACT inhaler  fluticasone (FLONASE) 50 MCG/ACT spray  LORYNA 3-0.02 MG tablet  MAGNESIUM  "PO  metoprolol tartrate (LOPRESSOR) 25 MG tablet  Multiple Vitamin (MULTI-VITAMIN) per tablet  Omega-3 Fatty Acids (OMEGA-3 FISH OIL PO)  VITAMIN D PO  ZYRTEC 10 MG OR TABS          Review of Systems  A medically appropriate review of systems was performed with pertinent positives and negatives noted in the HPI, and all other systems negative.    Physical Exam   Patient Vitals for the past 24 hrs:   BP Temp Temp src Pulse Resp SpO2 Height Weight   01/01/25 2251 131/83 98  F (36.7  C) Oral 107 18 97 % 1.702 m (5' 7\") 79.4 kg (175 lb)          Physical Exam  General: alert and in no acute distress on arrival  Head:  1 cm laceration just behind the left pinna.  No active bleeding noted.  Extends through the dermal layer.  Lungs:  nonlabored  CV:  extremities warm and perfused  Abd: nondistended  Skin: no rashes, no diaphoresis and skin color normal  Neuro: Patient awake, alert, speech is fluent,   Psychiatric: affect/mood normal,        ED Course                 Procedures  [facility] Procedure Note        Laceration Repair:    Performed by: Gumaro Talley MD  Authorized by: Gumaro Talley MD  Consent given by: Patient who states understanding of the procedure being performed after discussing the risks, benefits and alternatives.    Preparation: Patient was prepped and draped in usual sterile fashion.  Irrigation solution: saline    Body area:left scalp  Laceration length: 1cm  Contamination: The wound is not contaminated.  Foreign bodies:none  Tendon involvement: none  Anesthesia: none  Local anesthetic:   Anesthetic total:     Debridement: none  Skin closure: Closed with Wound adhesive  Technique: Wound adhesive  Approximation: close  Approximation difficulty: simple    Patient tolerance: Patient tolerated the procedure well with no immediate complications.                          No results found for this or any previous visit (from the past 24 hours).    MEDICATIONS GIVEN IN THE EMERGENCY " DEPARTMENT:  Medications - No data to display        Independent Interpretation (X-rays, CTs, rhythm strip):  None    Consultations/Discussion of Management or Tests:         Social Determinants of Health affecting care:         Assessments & Plan (with Medical Decision Making)  49 year old female who presents to the Emergency Department for evaluation of closed head injury, with hitting the head against a nightstand.  Injury occurred a short time prior to ED arrival.  No focal neurologic deficits.  She does have alcohol consumption this evening, and therefore I did consider imaging studies, however given the normal neurologic exam, family able to be present with the patient, with no vomiting, will defer on imaging at this point.  Wound was cleansed, and subsequently Dermabond applied for the 1 cm laceration, and patient tolerated this well.  Patient discharged home, with follow-up recommendations in clinic, and return precautions discussed if new or worsening symptoms develop.       I have reviewed the nursing notes.    I have reviewed the findings, diagnosis, plan and need for follow up with the patient.         Medical Decision Making  The patient's presentation was of low complexity (an acute and uncomplicated illness or injury).    The patient's evaluation involved:  strong consideration of a test (see separate area of note for details) that was ultimately deferred    The patient's management necessitated moderate risk (a decision regarding minor procedure (laceration repair) with risk factors of none).        NEW PRESCRIPTIONS STARTED AT TODAY'S ER VISIT  Discharge Medication List as of 1/2/2025 12:30 AM          Final diagnoses:   Scalp laceration, initial encounter   Fall, initial encounter       1/1/2025   [facility] EMERGENCY DEPT       Gumaro Talley MD  01/02/25 0402

## 2025-01-02 NOTE — ED TRIAGE NOTES
Pt presents with head laceration above ear after tripping and falling while trying to go to bed. Pt struck head on night stand. Pt reports have some drinks tonight. Bleeding controlled. No LOC. Denies anticoagulants. A&O x4.      Triage Assessment (Adult)       Row Name 01/01/25 8421          Triage Assessment    Airway WDL WDL        Respiratory WDL    Respiratory WDL WDL        Skin Circulation/Temperature WDL    Skin Circulation/Temperature WDL WDL        Cardiac WDL    Cardiac WDL WDL        Peripheral/Neurovascular WDL    Peripheral Neurovascular WDL WDL        Cognitive/Neuro/Behavioral WDL    Cognitive/Neuro/Behavioral WDL WDL

## 2025-01-06 ENCOUNTER — PATIENT OUTREACH (OUTPATIENT)
Dept: CARE COORDINATION | Facility: CLINIC | Age: 50
End: 2025-01-06
Payer: COMMERCIAL

## 2025-01-06 NOTE — LETTER
Hannah Perdomo  [address] [place] [id number]    Dear Hannah Perdomo,      I am a team member within the Connected Care Resource Center with [facility]. I recently tried to reach you to ensure you were doing well following a recent visit within our health system. I also wanted to take this chance to introduce Clinic Care Coordination.     Below is a description of Clinic Care Coordination and how this team can further assist you:       The Clinic Care Coordination team is made up of a Registered Nurse, , Financial Resource Worker, and a Community Health Worker who understand and can help navigate the health care system. The goal of clinic care coordination is to help you manage your health, improve access to care, and achieve optimal health outcomes. They work alongside your provider to assist you in determining your health and social needs, obtain health care and community resources, and provide you with necessary information and education. Clinic Care Coordination can work with you through any barriers and develop a care plan that helps coordinate and strengthen the relationship between you and your care team.    If you wish to connect with the Clinic Care Coordination Team, please let your [facility] Primary Care Provider or Clinic Care Team know and they can place a referral. The Clinic Care Coordination team will then reach out by phone to further support you.    We are focused on providing you with the highest-quality healthcare experience possible.    Sincerely,   Delores KAUR  Community Health Worker    Connected Care Resources   [facility]'s [address] [place] [address] ([phone]).

## 2025-01-06 NOTE — PROGRESS NOTES
[facility] Care Resource Center Contact  [facility]/Voicemail     Clinical Data: Care Coordination ED-sourced Outreach-     Outreach attempted x 2.  Left message on patient's voicemail, providing [facility]'s 24/7 scheduling and nurse triage phone number [phone] ([phone]) for questions/concerns and/or to schedule an appt with an [facility] provider.      Care Coordination introduction letter with explanation of Clinic Care Coordination services sent to patient via [employer]. Clinic Care Coordination services remain available via referral if needed.    Plan: [facility] will do no further outreaches at this time.       JANE Lazaro  [facility] Resource [place], [facility]    *Connected Care Resource Team does NOT follow patient ongoing. Referrals are identified based on internal discharge reports and the outreach is to ensure patient has an understanding of their discharge instructions.

## 2025-06-21 ENCOUNTER — HEALTH MAINTENANCE LETTER (OUTPATIENT)
Age: 50
End: 2025-06-21

## 2025-07-11 ENCOUNTER — APPOINTMENT (OUTPATIENT)
Dept: MRI IMAGING | Facility: CLINIC | Age: 50
End: 2025-07-11
Attending: EMERGENCY MEDICINE
Payer: COMMERCIAL

## 2025-07-11 ENCOUNTER — APPOINTMENT (OUTPATIENT)
Dept: CT IMAGING | Facility: CLINIC | Age: 50
End: 2025-07-11
Attending: EMERGENCY MEDICINE
Payer: COMMERCIAL

## 2025-07-11 ENCOUNTER — HOSPITAL ENCOUNTER (EMERGENCY)
Facility: CLINIC | Age: 50
Discharge: HOME | End: 2025-07-11
Attending: EMERGENCY MEDICINE | Admitting: EMERGENCY MEDICINE
Payer: COMMERCIAL

## 2025-07-11 VITALS
DIASTOLIC BLOOD PRESSURE: 74 MMHG | BODY MASS INDEX: 29.57 KG/M2 | WEIGHT: 184 LBS | RESPIRATION RATE: 22 BRPM | OXYGEN SATURATION: 99 % | TEMPERATURE: 98 F | HEIGHT: 66 IN | SYSTOLIC BLOOD PRESSURE: 129 MMHG | HEART RATE: 62 BPM

## 2025-07-11 DIAGNOSIS — H57.02 EPISODIC ANISOCORIA: ICD-10-CM

## 2025-07-11 LAB
ANION GAP SERPL CALCULATED.3IONS-SCNC: 11 MMOL/L (ref 7–15)
ATRIAL RATE - MUSE: 79 BPM
BASOPHILS # BLD AUTO: 0 10E3/UL (ref 0–0.2)
BASOPHILS NFR BLD AUTO: 1 %
BUN SERPL-MCNC: 10.2 MG/DL (ref 6–20)
CALCIUM SERPL-MCNC: 8.9 MG/DL (ref 8.8–10.4)
CHLORIDE SERPL-SCNC: 106 MMOL/L (ref 98–107)
CREAT SERPL-MCNC: 0.78 MG/DL (ref 0.51–0.95)
DIASTOLIC BLOOD PRESSURE - MUSE: NORMAL MMHG
EGFRCR SERPLBLD CKD-EPI 2021: >90 ML/MIN/1.73M2
EOSINOPHIL # BLD AUTO: 0.1 10E3/UL (ref 0–0.7)
EOSINOPHIL NFR BLD AUTO: 1 %
ERYTHROCYTE [DISTWIDTH] IN BLOOD BY AUTOMATED COUNT: 11.9 % (ref 10–15)
GLUCOSE BLDC GLUCOMTR-MCNC: 111 MG/DL (ref 70–99)
GLUCOSE SERPL-MCNC: 115 MG/DL (ref 70–99)
HCG SERPL QL: NEGATIVE
HCO3 SERPL-SCNC: 21 MMOL/L (ref 22–29)
HCT VFR BLD AUTO: 38.8 % (ref 35–47)
HGB BLD-MCNC: 13.1 G/DL (ref 11.7–15.7)
IMM GRANULOCYTES # BLD: 0 10E3/UL
IMM GRANULOCYTES NFR BLD: 0 %
INTERPRETATION ECG - MUSE: NORMAL
LYMPHOCYTES # BLD AUTO: 1.4 10E3/UL (ref 0.8–5.3)
LYMPHOCYTES NFR BLD AUTO: 28 %
MCH RBC QN AUTO: 29.6 PG (ref 26.5–33)
MCHC RBC AUTO-ENTMCNC: 33.8 G/DL (ref 31.5–36.5)
MCV RBC AUTO: 88 FL (ref 78–100)
MONOCYTES # BLD AUTO: 0.5 10E3/UL (ref 0–1.3)
MONOCYTES NFR BLD AUTO: 10 %
NEUTROPHILS # BLD AUTO: 3.1 10E3/UL (ref 1.6–8.3)
NEUTROPHILS NFR BLD AUTO: 61 %
NRBC # BLD AUTO: 0 10E3/UL
NRBC BLD AUTO-RTO: 0 /100
P AXIS - MUSE: 19 DEGREES
PLATELET # BLD AUTO: 268 10E3/UL (ref 150–450)
POTASSIUM SERPL-SCNC: 4.2 MMOL/L (ref 3.4–5.3)
PR INTERVAL - MUSE: 120 MS
QRS DURATION - MUSE: 86 MS
QT - MUSE: 354 MS
QTC - MUSE: 405 MS
R AXIS - MUSE: -5 DEGREES
RBC # BLD AUTO: 4.42 10E6/UL (ref 3.8–5.2)
SODIUM SERPL-SCNC: 138 MMOL/L (ref 135–145)
SYSTOLIC BLOOD PRESSURE - MUSE: NORMAL MMHG
T AXIS - MUSE: -7 DEGREES
VENTRICULAR RATE- MUSE: 79 BPM
WBC # BLD AUTO: 5.1 10E3/UL (ref 4–11)

## 2025-07-11 PROCEDURE — 250N000011 HC RX IP 250 OP 636: Performed by: EMERGENCY MEDICINE

## 2025-07-11 PROCEDURE — 99285 EMERGENCY DEPT VISIT HI MDM: CPT | Mod: 25

## 2025-07-11 PROCEDURE — 96374 THER/PROPH/DIAG INJ IV PUSH: CPT | Mod: 59

## 2025-07-11 PROCEDURE — 96361 HYDRATE IV INFUSION ADD-ON: CPT

## 2025-07-11 PROCEDURE — 80048 BASIC METABOLIC PNL TOTAL CA: CPT | Performed by: EMERGENCY MEDICINE

## 2025-07-11 PROCEDURE — 93010 ELECTROCARDIOGRAM REPORT: CPT | Performed by: EMERGENCY MEDICINE

## 2025-07-11 PROCEDURE — 85025 COMPLETE CBC W/AUTO DIFF WBC: CPT | Performed by: EMERGENCY MEDICINE

## 2025-07-11 PROCEDURE — 36415 COLL VENOUS BLD VENIPUNCTURE: CPT | Performed by: EMERGENCY MEDICINE

## 2025-07-11 PROCEDURE — 99285 EMERGENCY DEPT VISIT HI MDM: CPT | Performed by: EMERGENCY MEDICINE

## 2025-07-11 PROCEDURE — 258N000003 HC RX IP 258 OP 636: Performed by: EMERGENCY MEDICINE

## 2025-07-11 PROCEDURE — 70553 MRI BRAIN STEM W/O & W/DYE: CPT

## 2025-07-11 PROCEDURE — 82962 GLUCOSE BLOOD TEST: CPT

## 2025-07-11 PROCEDURE — 70450 CT HEAD/BRAIN W/O DYE: CPT

## 2025-07-11 PROCEDURE — 93005 ELECTROCARDIOGRAM TRACING: CPT

## 2025-07-11 PROCEDURE — 84703 CHORIONIC GONADOTROPIN ASSAY: CPT | Performed by: EMERGENCY MEDICINE

## 2025-07-11 PROCEDURE — 70496 CT ANGIOGRAPHY HEAD: CPT

## 2025-07-11 PROCEDURE — A9585 GADOBUTROL INJECTION: HCPCS | Performed by: EMERGENCY MEDICINE

## 2025-07-11 PROCEDURE — 255N000002 HC RX 255 OP 636: Performed by: EMERGENCY MEDICINE

## 2025-07-11 PROCEDURE — 250N000009 HC RX 250: Performed by: EMERGENCY MEDICINE

## 2025-07-11 PROCEDURE — 0042T CT HEAD PERFUSION W CONTRAST: CPT

## 2025-07-11 RX ORDER — IOPAMIDOL 755 MG/ML
117 INJECTION, SOLUTION INTRAVASCULAR ONCE
Status: COMPLETED | OUTPATIENT
Start: 2025-07-11 | End: 2025-07-11

## 2025-07-11 RX ORDER — SODIUM CHLORIDE, SODIUM LACTATE, POTASSIUM CHLORIDE, CALCIUM CHLORIDE 600; 310; 30; 20 MG/100ML; MG/100ML; MG/100ML; MG/100ML
1000 INJECTION, SOLUTION INTRAVENOUS CONTINUOUS
Status: DISCONTINUED | OUTPATIENT
Start: 2025-07-11 | End: 2025-07-11 | Stop reason: HOSPADM

## 2025-07-11 RX ORDER — GADOBUTROL 604.72 MG/ML
8 INJECTION INTRAVENOUS ONCE
Status: COMPLETED | OUTPATIENT
Start: 2025-07-11 | End: 2025-07-11

## 2025-07-11 RX ADMIN — SODIUM CHLORIDE, POTASSIUM CHLORIDE, SODIUM LACTATE AND CALCIUM CHLORIDE 500 ML: 600; 310; 30; 20 INJECTION, SOLUTION INTRAVENOUS at 10:23

## 2025-07-11 RX ADMIN — MIDAZOLAM 2 MG: 1 INJECTION INTRAMUSCULAR; INTRAVENOUS at 12:36

## 2025-07-11 RX ADMIN — SODIUM CHLORIDE 100 ML: 9 INJECTION, SOLUTION INTRAVENOUS at 10:36

## 2025-07-11 RX ADMIN — IOPAMIDOL 117 ML: 755 INJECTION, SOLUTION INTRAVENOUS at 10:36

## 2025-07-11 RX ADMIN — GADOBUTROL 8 ML: 604.72 INJECTION INTRAVENOUS at 12:44

## 2025-07-11 ASSESSMENT — VISUAL ACUITY
OU: 20/25
OS: 20/25
OU: 20/25
OS: 20/25
OD: 20/25
OS: 20/25
OD: 20/25
OD: 20/25
OU: 20/25

## 2025-07-11 ASSESSMENT — ENCOUNTER SYMPTOMS
HEADACHES: 1
GASTROINTESTINAL NEGATIVE: 1
ENDOCRINE NEGATIVE: 1
RESPIRATORY NEGATIVE: 1
CARDIOVASCULAR NEGATIVE: 1
CONSTITUTIONAL NEGATIVE: 1
PSYCHIATRIC NEGATIVE: 1
ALLERGIC/IMMUNOLOGIC NEGATIVE: 1
MUSCULOSKELETAL NEGATIVE: 1
HEMATOLOGIC/LYMPHATIC NEGATIVE: 1

## 2025-07-11 ASSESSMENT — COLUMBIA-SUICIDE SEVERITY RATING SCALE - C-SSRS
6. HAVE YOU EVER DONE ANYTHING, STARTED TO DO ANYTHING, OR PREPARED TO DO ANYTHING TO END YOUR LIFE?: NO
2. HAVE YOU ACTUALLY HAD ANY THOUGHTS OF KILLING YOURSELF IN THE PAST MONTH?: NO
1. IN THE PAST MONTH, HAVE YOU WISHED YOU WERE DEAD OR WISHED YOU COULD GO TO SLEEP AND NOT WAKE UP?: NO

## 2025-07-11 ASSESSMENT — ACTIVITIES OF DAILY LIVING (ADL)
ADLS_ACUITY_SCORE: 41

## 2025-07-11 NOTE — DISCHARGE INSTRUCTIONS
1) Your evaluation today did not reveal any evidence to suggest a stroke or brain tumor bleeding or an acute process in the brain or vasculature that would contribute to asymmetric pupils. At the time of the discharge and completion of your workup   Asymmetric pupils have resolved.    2) We have reviewed that typically asymmetric pupils with anisocoria likely acquired from topical agents which she do not seem to have had contact with and it is unlikely that clear drops with glycerin and naphazoline-which in review of local resources can cause dilated pupils.  Although you have been using the drops for quite some time you never had issues with asymmetric pupils.  Discussed that there is no indication to stop using at this time.  Discussed that if you develop any new concerns including loss of vision, eye pain or pressure or any concerns you should return to be reexamined.  Be sure to keep your upcoming visit with your eye care professional next month as reported (in the next 3 weeks).

## 2025-07-11 NOTE — ED NOTES
MD updated with CVA concern and in room for assessment and exam.   20g IV started, blood drawn and sent.  Pt on full monitor, ECG called for and being completed.

## 2025-07-11 NOTE — ED NOTES
Pt ready for MRI, paperwork completed and being faxed.  MD aware of clausterphobia and will order medication for comfort.

## 2025-07-11 NOTE — ED NOTES
Pt to and from CT with RN.  VSS and pt with no changes in symptoms.  L eye remains dilated, Center and L frontal BARAJAS remains 3/10, no visual disturbances.

## 2025-07-11 NOTE — ED NOTES
Pt L pupil now WNL, both eyes 3, round and brisk.    MD called and updated.   Pt remains with 3/10 HA and denies visual changes.   Neuro exam all WNL.

## 2025-07-11 NOTE — ED PROVIDER NOTES
History     Chief Complaint   Patient presents with    Eye Problem     HPI  Hannah Perdomo is a 50 year old female who resents with concern about presents with ipsilateral frontal headache and   Anisocoria.  Patient was seen on arrival as a stroke evaluation due to anisocoria and preceded by ipsilateral frontal headache.  Patient reported on arrival that around 8 AM before going workout she was putting a headband down and noted that her left pupil was larger than her t right pupil.  On intake patient was noted to have symmetric visual field testing 20/25 in both eyes    Reviewed the medical record. Reviewed visit on 1/1/2025.  Patient's current home prescribed indications were also reviewed.      On examination patient reports she was in her usual state of health until yesterday evening around 7 PM while watching TV when she developed a left frontal headache.  She went to bed around 9:30 PM and woke up this morning had her coffee at 7 AM at 8 AM she reported to workout and noted that her left pupil was bigger than her right pupil she reports that she has no prior diagnosis of migraine headaches actively does not get headaches.  She reports that she takes clear 5 over-the-counter drops and Zyrtec due to seasonal allergies.  She works as a .  She typically works out 5 times a week but did not complete her workup today after attempting to place her headband and noticed that her left pupil was bigger than her right pupil.  Due to her persistent left frontal headache and anisocoria she arrived by car with her son who drove to the department with her for further assessment and care she reports no speech difficulty she did notice that her left eye was blurry.  Which coincides with noting that her left pupil was larger than the right she was able to get herself dressed and walk normally.  Reports no new paresthesias.  She does report left-sided ache which she attributes to muscle  strain          Allergies:  Allergies   Allergen Reactions    Pcn [Penicillins]      When was a baby    Sulfa Antibiotics      rash       Problem List:    Patient Active Problem List    Diagnosis Date Noted    CARDIOVASCULAR SCREENING; LDL GOAL LESS THAN 160 10/31/2010     Priority: Medium        Past Medical History:    Past Medical History:   Diagnosis Date    Palpitations        Past Surgical History:    Past Surgical History:   Procedure Laterality Date    COLONOSCOPY N/A 6/26/2023    Procedure: Colonoscopy;  Surgeon: Samson Rob DO;  Location: [facility]    HC RECONSTR NOSE+ASPEN SEPTAL REPAIR      ZZC APPENDECTOMY         Family History:    Family History   Problem Relation Age of Onset    Diabetes Father     Cerebrovascular Disease Father     Parkinsonism Mother     Cerebrovascular Disease Mother        Social History:  Marital Status:   [2]  Social History     Tobacco Use    Smoking status: Never     Passive exposure: Never    Smokeless tobacco: Never   Vaping Use    Vaping status: Never Used   Substance Use Topics    Alcohol use: Yes     Comment: 3 drinks per week    Drug use: No        Medications:    albuterol (PROAIR HFA/PROVENTIL HFA/VENTOLIN HFA) 108 (90 Base) MCG/ACT inhaler  fluticasone (FLONASE) 50 MCG/ACT spray  LORYNA 3-0.02 MG tablet  MAGNESIUM PO  metoprolol tartrate (LOPRESSOR) 25 MG tablet  Multiple Vitamin (MULTI-VITAMIN) per tablet  Omega-3 Fatty Acids (OMEGA-3 FISH OIL PO)  VITAMIN D PO  ZYRTEC 10 MG OR TABS          Review of Systems   Constitutional: Negative.    HENT: Negative.     Eyes:  Positive for visual disturbance (left pupil larger noted since 8am).   Respiratory: Negative.     Cardiovascular: Negative.    Gastrointestinal: Negative.    Endocrine: Negative.    Genitourinary: Negative.    Musculoskeletal: Negative.    Skin: Negative.    Allergic/Immunologic: Negative.    Neurological:  Positive for headaches.   Hematological: Negative.    Psychiatric/Behavioral:  "Negative.     All other systems reviewed and are negative.      Physical Exam   BP: 136/88  Pulse: 92  Temp: 98  F (36.7  C)  Resp: 14  Height: 167.6 cm (5' 6\")  Weight: 77.1 kg (170 lb)  SpO2: 98 %      Physical Exam  Constitutional:       General: She is not in acute distress.     Appearance: Normal appearance. She is not ill-appearing, toxic-appearing or diaphoretic.   HENT:      Head: Normocephalic and atraumatic.      Right Ear: Tympanic membrane normal.      Left Ear: Tympanic membrane normal.      Nose: Nose normal.      Mouth/Throat:      Mouth: Mucous membranes are moist.   Eyes:      Extraocular Movements: Extraocular movements intact.      Pupils: Pupils are equal, round, and reactive to light.   Cardiovascular:      Rate and Rhythm: Normal rate and regular rhythm.      Pulses: Normal pulses.      Heart sounds: Normal heart sounds.   Pulmonary:      Effort: Pulmonary effort is normal. No respiratory distress.      Breath sounds: Normal breath sounds. No stridor. No wheezing, rhonchi or rales.   Chest:      Chest wall: No tenderness.   Musculoskeletal:         General: No swelling, tenderness, deformity or signs of injury.      Cervical back: Normal range of motion and neck supple.      Right lower leg: No edema.      Left lower leg: No edema.   Skin:     Capillary Refill: Capillary refill takes less than 2 seconds.      Coloration: Skin is not jaundiced or pale.      Findings: No bruising, erythema, lesion or rash.   Neurological:      General: No focal deficit present.      Mental Status: She is alert and oriented to person, place, and time.      Cranial Nerves: No cranial nerve deficit.      Sensory: No sensory deficit.      Motor: No weakness.      Coordination: Coordination normal.      Gait: Gait normal.      Deep Tendon Reflexes: Reflexes normal.   Psychiatric:         Mood and Affect: Mood normal.         Behavior: Behavior normal.         Thought Content: Thought content normal.         Judgment: " Judgment normal.         ED Course        Procedures              EKG Interpretation:      Interpreted by Andre Acevedo MD  Time reviewed: 1030  Symptoms at time of EKG: Left frontal headache, anisocoria  Rhythm: normal sinus   Rate: Normal  Axis: Normal  Ectopy: none  Conduction: normal  ST Segments/ T Waves: Non-specific ST-T wave changes  Q Waves: nonspecific  Comparison to prior: When compared with EKG dated 11/10/2022 no acute ischemia is appreciated    Clinical Impression: Normal sinus rhythm with nonspecific T wave changes without acute ischemia from recent comparison from 11/10/2022      Critical Care time:  none     None       ED medications:  Medications   lactated ringers infusion 1,000 mL (has no administration in time range)   lactated ringers BOLUS 500 mL (500 mLs Intravenous $New Bag 7/11/25 1023)   iopamidol (ISOVUE-370) solution 117 mL (117 mLs Intravenous $Given 7/11/25 1036)     And   sodium chloride 0.9 % bag for CT scan flush (100 mLs As instructed $Given 7/11/25 1036)   midazolam (VERSED) injection 2 mg (2 mg Intravenous $Given 7/11/25 1236)   gadobutrol (GADAVIST) injection 8 mL (8 mLs Intravenous $Given 7/11/25 1244)        ED Vitals:  Vitals:    07/11/25 1215 07/11/25 1330 07/11/25 1345 07/11/25 1400   BP: 119/78 131/74 126/71 130/70   Pulse: 75 64 63 63   Resp: 22      Temp:       TempSrc:       SpO2:  99% 100% 100%   Weight:       Height:            ED labs and imaging:  Results for orders placed or performed during the hospital encounter of 07/11/25   CT Head w/o Contrast     Status: None    Narrative    EXAM: CT HEAD W/O CONTRAST, CTA HEAD NECK W CONTRAST, CT HEAD PERFUSION W CONTRAST  LOCATION: [facility]  DATE: 7/11/2025    INDICATION: Acute right frontal headache with accompanying anisicoria at 8am.  Evaluate for acute intracranial process  COMPARISON: None.  CONTRAST: 117 mL Isovue 370  TECHNIQUE: Head and neck CT angiogram with IV contrast.  Noncontrast head CT followed by axial helical CT images of the head and neck vessels obtained during the arterial phase of intravenous contrast administration. Axial 2D reconstructed images and   multiplanar 3D MIP reconstructed images of the head and neck vessels were performed by the technologist. Additional CT cerebral perfusion was performed utilizing a second contrast bolus. Perfusion data were post processed with generation of standard   perfusion maps and estimation of ischemic/infarcted volumes utilizing standard threshold values. Dose reduction techniques were used. All stenosis measurements made according to NASCET criteria unless otherwise specified.    FINDINGS:  HEAD CT:  INTRACRANIAL CONTENTS: No acute intracranial hemorrhage. No CT evidence of an acute infarction. No significant mass effect. Normal parenchymal attenuation for patient age. Normal ventricles and sulci for patient age.    VISUALIZED ORBITS/SINUSES/MASTOIDS: No intraorbital abnormality. No significant paranasal sinus mucosal disease. No significant middle ear or mastoid effusion.    BONES/SOFT TISSUES: No acute abnormality.    HEAD CTA:  ANTERIOR CIRCULATION: No significant stenosis or occlusion. No aneurysm. No high flow vascular malformation. Standard [place] anatomy.    POSTERIOR CIRCULATION: No significant stenosis or occlusion. No aneurysm. No high flow vascular malformation. Patent basilar artery.     DURAL VENOUS SINUSES: Expected major dural venous sinus enhancement.    NECK CTA:  AORTIC ARCH: Classic aortic arch anatomy. No significant stenosis of the great vessel origins.    RIGHT CAROTID: No measurable stenosis. No dissection.    LEFT CAROTID: No measurable stenosis. No dissection.     VERTEBRAL ARTERIES: No high grade stenosis. No dissection.    NONVASCULAR STRUCTURES: No acute finding. Clear lung apices.     CT PERFUSION:  PERFUSION MAPS: Symmetrical cerebral perfusion. No focal deficits in cerebral blood flow or  volume to suggest ischemia/oligemia.    RAPID ANALYSIS:  CBF<30%: 0  Tmax>6sec: 0  Mismatch volume: 0  Mismatch ratio: None      Impression    IMPRESSION:  HEAD CT:  1.  No acute intracranial abnormality.    HEAD CTA:   1.  No large vessel occlusion or hemodynamically significant stenosis throughout the [place] arteries.    NECK CTA:  1. No hemodynamically significant stenosis throughout the major neck arteries.  2. No dissection.    CT PERFUSION:  1.  No core infarct or penumbra.   CTA Head Neck with Contrast     Status: None    Narrative    EXAM: CT HEAD W/O CONTRAST, CTA HEAD NECK W CONTRAST, CT HEAD PERFUSION W CONTRAST  LOCATION: [facility]  DATE: 7/11/2025    INDICATION: Acute right frontal headache with accompanying anisicoria at 8am.  Evaluate for acute intracranial process  COMPARISON: None.  CONTRAST: 117 mL Isovue 370  TECHNIQUE: Head and neck CT angiogram with IV contrast. Noncontrast head CT followed by axial helical CT images of the head and neck vessels obtained during the arterial phase of intravenous contrast administration. Axial 2D reconstructed images and   multiplanar 3D MIP reconstructed images of the head and neck vessels were performed by the technologist. Additional CT cerebral perfusion was performed utilizing a second contrast bolus. Perfusion data were post processed with generation of standard   perfusion maps and estimation of ischemic/infarcted volumes utilizing standard threshold values. Dose reduction techniques were used. All stenosis measurements made according to NASCET criteria unless otherwise specified.    FINDINGS:  HEAD CT:  INTRACRANIAL CONTENTS: No acute intracranial hemorrhage. No CT evidence of an acute infarction. No significant mass effect. Normal parenchymal attenuation for patient age. Normal ventricles and sulci for patient age.    VISUALIZED ORBITS/SINUSES/MASTOIDS: No intraorbital abnormality. No significant paranasal sinus  mucosal disease. No significant middle ear or mastoid effusion.    BONES/SOFT TISSUES: No acute abnormality.    HEAD CTA:  ANTERIOR CIRCULATION: No significant stenosis or occlusion. No aneurysm. No high flow vascular malformation. Standard [place] anatomy.    POSTERIOR CIRCULATION: No significant stenosis or occlusion. No aneurysm. No high flow vascular malformation. Patent basilar artery.     DURAL VENOUS SINUSES: Expected major dural venous sinus enhancement.    NECK CTA:  AORTIC ARCH: Classic aortic arch anatomy. No significant stenosis of the great vessel origins.    RIGHT CAROTID: No measurable stenosis. No dissection.    LEFT CAROTID: No measurable stenosis. No dissection.     VERTEBRAL ARTERIES: No high grade stenosis. No dissection.    NONVASCULAR STRUCTURES: No acute finding. Clear lung apices.     CT PERFUSION:  PERFUSION MAPS: Symmetrical cerebral perfusion. No focal deficits in cerebral blood flow or volume to suggest ischemia/oligemia.    RAPID ANALYSIS:  CBF<30%: 0  Tmax>6sec: 0  Mismatch volume: 0  Mismatch ratio: None      Impression    IMPRESSION:  HEAD CT:  1.  No acute intracranial abnormality.    HEAD CTA:   1.  No large vessel occlusion or hemodynamically significant stenosis throughout the [place] arteries.    NECK CTA:  1. No hemodynamically significant stenosis throughout the major neck arteries.  2. No dissection.    CT PERFUSION:  1.  No core infarct or penumbra.   CT Head Perfusion w Contrast - For Tier 2 Stroke     Status: None    Narrative    EXAM: CT HEAD W/O CONTRAST, CTA HEAD NECK W CONTRAST, CT HEAD PERFUSION W CONTRAST  LOCATION: [facility]  DATE: 7/11/2025    INDICATION: Acute right frontal headache with accompanying anisicoria at 8am.  Evaluate for acute intracranial process  COMPARISON: None.  CONTRAST: 117 mL Isovue 370  TECHNIQUE: Head and neck CT angiogram with IV contrast. Noncontrast head CT followed by axial helical CT images  of the head and neck vessels obtained during the arterial phase of intravenous contrast administration. Axial 2D reconstructed images and   multiplanar 3D MIP reconstructed images of the head and neck vessels were performed by the technologist. Additional CT cerebral perfusion was performed utilizing a second contrast bolus. Perfusion data were post processed with generation of standard   perfusion maps and estimation of ischemic/infarcted volumes utilizing standard threshold values. Dose reduction techniques were used. All stenosis measurements made according to NASCET criteria unless otherwise specified.    FINDINGS:  HEAD CT:  INTRACRANIAL CONTENTS: No acute intracranial hemorrhage. No CT evidence of an acute infarction. No significant mass effect. Normal parenchymal attenuation for patient age. Normal ventricles and sulci for patient age.    VISUALIZED ORBITS/SINUSES/MASTOIDS: No intraorbital abnormality. No significant paranasal sinus mucosal disease. No significant middle ear or mastoid effusion.    BONES/SOFT TISSUES: No acute abnormality.    HEAD CTA:  ANTERIOR CIRCULATION: No significant stenosis or occlusion. No aneurysm. No high flow vascular malformation. Standard [place] of [place] anatomy.    POSTERIOR CIRCULATION: No significant stenosis or occlusion. No aneurysm. No high flow vascular malformation. Patent basilar artery.     DURAL VENOUS SINUSES: Expected major dural venous sinus enhancement.    NECK CTA:  AORTIC ARCH: Classic aortic arch anatomy. No significant stenosis of the great vessel origins.    RIGHT CAROTID: No measurable stenosis. No dissection.    LEFT CAROTID: No measurable stenosis. No dissection.     VERTEBRAL ARTERIES: No high grade stenosis. No dissection.    NONVASCULAR STRUCTURES: No acute finding. Clear lung apices.     CT PERFUSION:  PERFUSION MAPS: Symmetrical cerebral perfusion. No focal deficits in cerebral blood flow or volume to suggest ischemia/oligemia.    RAPID  ANALYSIS:  CBF<30%: 0  Tmax>6sec: 0  Mismatch volume: 0  Mismatch ratio: None      Impression    IMPRESSION:  HEAD CT:  1.  No acute intracranial abnormality.    HEAD CTA:   1.  No large vessel occlusion or hemodynamically significant stenosis throughout the [place] arteries.    NECK CTA:  1. No hemodynamically significant stenosis throughout the major neck arteries.  2. No dissection.    CT PERFUSION:  1.  No core infarct or penumbra.   MR Brain w/o & w Contrast     Status: None    Narrative    EXAM: MR BRAIN W/O and W CONTRAST  LOCATION: [facility]  DATE: 7/11/2025    INDICATION: Acute left frontal headache with accompanying anisicoria at 8am. Evaluate for acute intracranial process  COMPARISON: CT/CTA 7/11/2025.  CONTRAST: 8 mL Gadavist.  TECHNIQUE: Routine multiplanar multisequence head MRI without and with intravenous contrast.    FINDINGS:  INTRACRANIAL CONTENTS: No acute or subacute infarct. No mass, acute hemorrhage, or extra-axial fluid collections. Minimal nonspecific T2/FLAIR hyperintensities within the cerebral white matter most consistent with mild chronic microvascular ischemic   change. Age-appropriate ventricles and sulci. Normal position of the cerebellar tonsils. No abnormal parenchymal, pachymeningeal or leptomeningeal enhancement is demonstrated. The major intracranial flow voids are unremarkable.    SELLA: No abnormality accounting for technique.    OSSEOUS STRUCTURES/SOFT TISSUES: Unremarkable marrow signal. Unremarkable extracranial soft tissues. Limited images of the upper cervical spine demonstrate no acute abnormality.      ORBITS: No abnormality accounting for technique.     SINUSES/MASTOIDS: No paranasal sinus mucosal disease. No middle ear or mastoid effusion.       Impression    IMPRESSION:  1.  No evidence of acute infarction or other acute intracranial abnormality.     Basic metabolic panel     Status: Abnormal   Result Value Ref Range    Sodium  138 135 - 145 mmol/L    Potassium 4.2 3.4 - 5.3 mmol/L    Chloride 106 98 - 107 mmol/L    Carbon Dioxide (CO2) 21 (L) 22 - 29 mmol/L    Anion Gap 11 7 - 15 mmol/L    Urea Nitrogen 10.2 6.0 - 20.0 mg/dL    Creatinine 0.78 0.51 - 0.95 mg/dL    GFR Estimate >90 >60 mL/min/1.73m2    Calcium 8.9 8.8 - 10.4 mg/dL    Glucose 115 (H) 70 - 99 mg/dL   hCG Qualitative Pregnancy     Status: Normal   Result Value Ref Range    hCG Serum Qualitative Negative Negative   CBC with platelets and differential     Status: None   Result Value Ref Range    WBC Count 5.1 4.0 - 11.0 10e3/uL    RBC Count 4.42 3.80 - 5.20 10e6/uL    Hemoglobin 13.1 11.7 - 15.7 g/dL    Hematocrit 38.8 35.0 - 47.0 %    MCV 88 78 - 100 fL    MCH 29.6 26.5 - 33.0 pg    MCHC 33.8 31.5 - 36.5 g/dL    RDW 11.9 10.0 - 15.0 %    Platelet Count 268 150 - 450 10e3/uL    % Neutrophils 61 %    % Lymphocytes 28 %    % Monocytes 10 %    % Eosinophils 1 %    % Basophils 1 %    % Immature Granulocytes 0 %    NRBCs per 100 WBC 0 <1 /100    Absolute Neutrophils 3.1 1.6 - 8.3 10e3/uL    Absolute Lymphocytes 1.4 0.8 - 5.3 10e3/uL    Absolute Monocytes 0.5 0.0 - 1.3 10e3/uL    Absolute Eosinophils 0.1 0.0 - 0.7 10e3/uL    Absolute Basophils 0.0 0.0 - 0.2 10e3/uL    Absolute Immature Granulocytes 0.0 <=0.4 10e3/uL    Absolute NRBCs 0.0 10e3/uL   Glucose by meter     Status: Abnormal   Result Value Ref Range    GLUCOSE BY METER POCT 111 (H) 70 - 99 mg/dL   EKG 12 lead     Status: None (Preliminary result)   Result Value Ref Range    Systolic Blood Pressure  mmHg    Diastolic Blood Pressure  mmHg    Ventricular Rate 79 BPM    Atrial Rate 79 BPM    AR Interval 120 ms    QRS Duration 86 ms     ms    QTc 405 ms    P Axis 19 degrees    R AXIS -5 degrees    T Axis -7 degrees    Interpretation ECG       Sinus rhythm  Minimal voltage criteria for LVH, may be normal variant ( R in aVL )  Borderline ECG  When compared with ECG of 01-Oct-2004 12:49,  Nonspecific T wave abnormality now  evident in Anterior leads     CBC with platelets differential     Status: None    Narrative    The following orders were created for panel order CBC with platelets differential.  Procedure                               Abnormality         Status                     ---------                               -----------         ------                     CBC with platelets and ...[[id number]]                      Final result                 Please view results for these tests on the individual orders.   CBC with Platelets & Differential *Canceled*     Status: None ()    Narrative    The following orders were created for panel order CBC with Platelets & Differential.  Procedure                               Abnormality         Status                     ---------                               -----------         ------                       Please view results for these tests on the individual orders.          Assessments & Plan (with Medical Decision Making)   Assessment Summary and clinical Impression: 50-year-old female who presented with concern about anisocoria preceded by ipsilateral frontal headache.  Patient reported noting symptoms at around 8 AM after attempting to put on her head band to go workout. she reported no previous history of anisocoria, no visual change or loss, and active headache on arrival..  She was seen on arrival with rapid assessment due to concern for possible stroke syndrome with ipsilateral headache and accompanying anisocoria.  On  arrival she was To Be Afebrile.  Blood Pressure Was 116/63, 98% on Room Air  Patient was concerned about her left visual field disturbance with blurriness consistent with her enlarged pupil on the same side.  She reported headache began yesterday evening around 7 PM went to bed at 9:30 PM and woke up this morning for routine around 7 AM and noted the asymmetric pupils that around 8 AM while placing her headband in preparation for workout.  No other accompanying  symptoms specifically no speech difficulty gait instability and no extremity paresthesias.  With persistent left frontal headache despite 3 tablets of ibuprofen and using clear drops over-the-counter and Zyrtec for seasonal allergies she had workup with her son for further assessment and care. Visual acuity on arrival is To be 20/25 bilaterally.  left eye 8 mm, right eye 4 mm-equal reactive direct and consensual light reflex.  Given anisocoria and ipsilateral headache we discussed the likely cause of her anisocoria in light of her current medications and report of no ophthalmic solution to her medications or potentially could trigger anisocoria.  Advanced imaging of the head and vascular imaging was obtained to exclude an acute stroke syndrome given accompanying ipsilateral headache.  Workup and imaging revealed no acute findings on head CT and CTA head and neck.  Patient sinus tachycardia improved during her ED course of care but did not resolve completely which prompted brain MRI imaging. MRI brain revealed no acute findings.  She was discharged with acquired anisocoria with plan to follow-up with her eye care professional if persistence and visual changes of concern.    ED course and plan:  Patient was seen with rapid assessment on arrival due to concern for ipsilateral frontal headache and anisocoria although she had no focal neurologic deficits specifically no visual field loss, and no facial or lower extremity symptoms specifically no weakness or paresthesias and no speech change  Notified during ED course of care by primary RN at 11:20 AM-patient's anisocoria resolved. Head CT and CTA head and neck revealed no acute findings.  See details outlined in radiology report.    With normalization and interval resolution of her anisocoria and reassuring head CT and CTA head and neck we discussed the role of brain imaging with MRI given low suspicion that symptoms are due to acute intracranial process or acute  cerebrovascular process.    On reexamination at 11:50 AM the patient had improved anisocoria but was not resolved and we elected to proceed with brain MRI imaging.  Patient was able to confirm that she is on clear eyedrops which the ingredients include glycerin naphazoline. She required anxiolysis to facilitate MRI imaging. Brain MRI revealed no acute findings.  Patient was discharged with suspicion that her anisocoria was likely acquired with plan for watchful waiting and follow-up with her eye care professional if there is persistent visual disturbance or anisocoria she did report prior to discharge that she has a scheduled annual eye exam in 3 weeks.  Patient has Hospital medical record and ED course of care expressed understanding and comfort with the plan of care.      Disclaimer: This note consists of symbols derived from keyboarding, dictation and/or voice recognition software. As a result, there may be errors in the script that have gone undetected. Please consider this when interpreting information found in this chart.   I have reviewed the nursing notes.    I have reviewed the findings, diagnosis, plan and need for follow up with the patient.           Medical Decision Making  The patient's presentation was of high complexity (ipsilateral headache acutely with accompanying anisocoria-5mm asymmetry).    The patient's evaluation involved:  ordering and/or review of 3+ test(s) in this encounter (serial neurologic examination, rapid assessment due to possibility of stroke syndrome, blood work, advanced imaging of the head and neck and vascular imaging)    The patient's management necessitated high risk (follow-up with the treating eye care professional persistent symptoms).        New Prescriptions    No medications on file       Final diagnoses:   Episodic anisocoria - Suspect acquired with reassuring neuroimaging       7/11/2025   [facility] EMERGENCY DEPT       Andre Acevedo  MD  07/11/25 1503

## 2025-07-11 NOTE — ED TRIAGE NOTES
"   Last night pt had a HA, has allergies and can get HA.   Pt did not take anything, she woke in the night with a HA, and still didn't take anything.  This AM she took ibuprofen and zyrtec, HA is better but not gone 3/10.  Central frontal, maybe to her L Side.      Pt with 20/25 in bilat  Pt denies photophobia, isn't really blurry, for \"there is something different or wrong.  Pt was going to work out, and then noticed her L eye was dilated.      EOMs intact, no field cuts noted.  L pupil 8cm and R 3 cm.  No eye pain.  No nausea or vomiting.  Pt denies clotting disorder, no HTN, or high cholesteral.   Both parent with strokes in their 50's or 60's.     Full neuro exam completed and wnl except CN 3 on L for dilated pupil        "

## (undated) RX ORDER — PROPOFOL 10 MG/ML
INJECTION, EMULSION INTRAVENOUS
Status: DISPENSED
Start: 2023-06-26